# Patient Record
Sex: MALE | Race: WHITE | NOT HISPANIC OR LATINO | Employment: FULL TIME | RURAL
[De-identification: names, ages, dates, MRNs, and addresses within clinical notes are randomized per-mention and may not be internally consistent; named-entity substitution may affect disease eponyms.]

---

## 2022-09-23 ENCOUNTER — OFFICE VISIT (OUTPATIENT)
Dept: FAMILY MEDICINE | Facility: CLINIC | Age: 57
End: 2022-09-23

## 2022-09-23 VITALS
TEMPERATURE: 99 F | WEIGHT: 190 LBS | DIASTOLIC BLOOD PRESSURE: 86 MMHG | HEART RATE: 67 BPM | OXYGEN SATURATION: 98 % | HEIGHT: 71 IN | RESPIRATION RATE: 18 BRPM | BODY MASS INDEX: 26.6 KG/M2 | SYSTOLIC BLOOD PRESSURE: 181 MMHG

## 2022-09-23 DIAGNOSIS — L08.9 SKIN INFECTION: Primary | ICD-10-CM

## 2022-09-23 PROCEDURE — 99202 PR OFFICE/OUTPT VISIT, NEW, LEVL II, 15-29 MIN: ICD-10-PCS | Mod: ,,, | Performed by: FAMILY MEDICINE

## 2022-09-23 PROCEDURE — 99051 PR MEDICAL SERVICES, EVE/WKEND/HOLIDAY: ICD-10-PCS | Mod: ,,, | Performed by: FAMILY MEDICINE

## 2022-09-23 PROCEDURE — 99202 OFFICE O/P NEW SF 15 MIN: CPT | Mod: ,,, | Performed by: FAMILY MEDICINE

## 2022-09-23 PROCEDURE — 99051 MED SERV EVE/WKEND/HOLIDAY: CPT | Mod: ,,, | Performed by: FAMILY MEDICINE

## 2022-09-23 RX ORDER — CLINDAMYCIN HYDROCHLORIDE 300 MG/1
300 CAPSULE ORAL EVERY 6 HOURS
Qty: 28 CAPSULE | Refills: 0 | Status: SHIPPED | OUTPATIENT
Start: 2022-09-23

## 2022-09-23 RX ORDER — HYDROCODONE BITARTRATE AND ACETAMINOPHEN 7.5; 325 MG/1; MG/1
1 TABLET ORAL EVERY 8 HOURS PRN
Qty: 6 TABLET | Refills: 0 | Status: SHIPPED | OUTPATIENT
Start: 2022-09-23
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
## 2022-10-10 ENCOUNTER — HOSPITAL ENCOUNTER (EMERGENCY)
Facility: HOSPITAL | Age: 57
Discharge: HOME | End: 2022-10-10

## 2022-10-10 VITALS
BODY MASS INDEX: 28 KG/M2 | HEIGHT: 71 IN | TEMPERATURE: 98 F | DIASTOLIC BLOOD PRESSURE: 77 MMHG | OXYGEN SATURATION: 97 % | HEART RATE: 50 BPM | SYSTOLIC BLOOD PRESSURE: 175 MMHG | WEIGHT: 200 LBS | RESPIRATION RATE: 20 BRPM

## 2022-10-10 DIAGNOSIS — T30.0 BURN: ICD-10-CM

## 2022-10-10 DIAGNOSIS — L98.499: Primary | ICD-10-CM

## 2022-10-10 PROCEDURE — 99283 EMERGENCY DEPT VISIT LOW MDM: CPT | Mod: ,,,

## 2022-10-10 PROCEDURE — 99281 EMR DPT VST MAYX REQ PHY/QHP: CPT

## 2022-10-10 PROCEDURE — 99283 PR EMERGENCY DEPT VISIT,LEVEL III: ICD-10-PCS | Mod: ,,,
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
## 2022-10-11 NOTE — DISCHARGE INSTRUCTIONS
Follow-up with Dr. Ambriz, you will be contacted with this appointment date and time. Return to Emergency Department for any new or worsening symptoms.

## 2023-03-14 ENCOUNTER — HOSPITAL ENCOUNTER (EMERGENCY)
Facility: HOSPITAL | Age: 58
Discharge: HOME | End: 2023-03-14

## 2023-03-14 VITALS
TEMPERATURE: 99 F | WEIGHT: 180 LBS | HEIGHT: 71 IN | HEART RATE: 55 BPM | RESPIRATION RATE: 17 BRPM | SYSTOLIC BLOOD PRESSURE: 177 MMHG | OXYGEN SATURATION: 99 % | BODY MASS INDEX: 25.2 KG/M2 | DIASTOLIC BLOOD PRESSURE: 86 MMHG

## 2023-03-14 DIAGNOSIS — S99.929A FOOT INJURY: ICD-10-CM

## 2023-03-14 DIAGNOSIS — M79.673 PAIN OF FOOT, UNSPECIFIED LATERALITY: Primary | ICD-10-CM

## 2023-03-14 PROCEDURE — 99284 EMERGENCY DEPT VISIT MOD MDM: CPT

## 2023-03-14 PROCEDURE — 96372 THER/PROPH/DIAG INJ SC/IM: CPT | Performed by: NURSE PRACTITIONER

## 2023-03-14 PROCEDURE — 99283 EMERGENCY DEPT VISIT LOW MDM: CPT | Mod: ,,, | Performed by: NURSE PRACTITIONER

## 2023-03-14 PROCEDURE — 63600175 PHARM REV CODE 636 W HCPCS: Performed by: NURSE PRACTITIONER

## 2023-03-14 PROCEDURE — 99283 PR EMERGENCY DEPT VISIT,LEVEL III: ICD-10-PCS | Mod: ,,, | Performed by: NURSE PRACTITIONER

## 2023-03-14 RX ORDER — KETOROLAC TROMETHAMINE 30 MG/ML
60 INJECTION, SOLUTION INTRAMUSCULAR; INTRAVENOUS
Status: COMPLETED | OUTPATIENT
Start: 2023-03-14 | End: 2023-03-14

## 2023-03-14 RX ORDER — NAPROXEN 375 MG/1
375 TABLET ORAL 2 TIMES DAILY WITH MEALS
Qty: 10 TABLET | Refills: 0 | Status: SHIPPED | OUTPATIENT
Start: 2023-03-14 | End: 2023-03-19

## 2023-03-14 RX ADMIN — KETOROLAC TROMETHAMINE 60 MG: 30 INJECTION, SOLUTION INTRAMUSCULAR at 12:03

## 2023-03-14 NOTE — ED TRIAGE NOTES
Pt reports having right heel/foot pain. He states it started this AM after stepping out of a tractor.

## 2023-03-14 NOTE — ED PROVIDER NOTES
Encounter Date: 3/14/2023       History     Chief Complaint   Patient presents with    Foot Injury     57-year-old male presents to the emergency department to be evaluated for right foot pain.  His foot pain began yesterday after he jumped off of a dump truck and back her repeatedly all day.    The history is provided by the patient.   Foot Injury   The incident occurred yesterday. Pertinent negatives include no numbness, no inability to bear weight, no loss of motion, no muscle weakness, no loss of sensation and no tingling.   Review of patient's allergies indicates:  No Known Allergies  No past medical history on file.  No past surgical history on file.  No family history on file.  Social History     Tobacco Use    Smoking status: Every Day     Packs/day: 1.00     Types: Cigarettes    Smokeless tobacco: Never     Review of Systems   Respiratory:  Negative for shortness of breath.    Cardiovascular:  Negative for chest pain.   Musculoskeletal:  Negative for back pain and neck pain.   Neurological:  Negative for tingling and numbness.   All other systems reviewed and are negative.    Physical Exam     Initial Vitals [03/14/23 1148]   BP Pulse Resp Temp SpO2   (!) 177/86 (!) 55 17 98.7 °F (37.1 °C) 99 %      MAP       --         Physical Exam    Vitals reviewed.  Constitutional: He appears well-developed and well-nourished.   Neck: Neck supple.   Cardiovascular:  Normal rate and regular rhythm.           Pulmonary/Chest: Breath sounds normal.   Abdominal: Abdomen is soft. Bowel sounds are normal. He exhibits no distension and no mass. There is no abdominal tenderness. There is no rebound and no guarding.   Musculoskeletal:         General: Normal range of motion.      Cervical back: Neck supple.      Right foot: Normal.     Neurological: He is alert and oriented to person, place, and time. He has normal strength. GCS score is 15. GCS eye subscore is 4. GCS verbal subscore is 5. GCS motor subscore is 6.   Skin: Skin  is warm and dry. Capillary refill takes less than 2 seconds.   Psychiatric: He has a normal mood and affect.       Medical Screening Exam   See Full Note    ED Course   Procedures  Labs Reviewed - No data to display       Imaging Results              X-Ray Foot Complete Right (Final result)  Result time 23 12:19:51      Final result by Fabian Muñoz MD (23 12:19:51)                   Impression:      As above.      Electronically signed by: Fabian Muñoz  Date:    2023  Time:    12:19               Narrative:    EXAMINATION:  XR FOOT COMPLETE 3 VIEW RIGHT    CLINICAL HISTORY:  . Unspecified injury of unspecified foot, initial encounter    TECHNIQUE:  AP, lateral, and oblique views of the right foot were performed.    COMPARISON:  None    FINDINGS:  No fracture identified.  No dislocation.  There is a hallux valgus deformity with osteophyte formation of the great toe MTP.  Calcaneal enthesophytes seen.                                       Medications   ketorolac injection 60 mg (has no administration in time range)     Medical Decision Makin-year-old male presents to the emergency department to be evaluated for right foot pain.  His foot pain began yesterday after he jumped off of a dump truck and back her repeatedly all day.  I ordered X-rays and personally reviewed them and reviewed the radiologist interpretation.  Xray significant for No fracture identified.  No dislocation.  There is a hallux valgus deformity with osteophyte formation of the great toe MTP.  Calcaneal enthesophytes seen.    Patient received Toradol IM in the emergency department  Patient was discharged with a prescription for naproxen  Diagnosis:  Foot pain  Patient was discharged in stable condition.  Detailed return precautions discussed.                   Clinical Impression:   Final diagnoses:  [S99.929A] Foot injury  [M79.673] Pain of foot, unspecified laterality (Primary)        ED Disposition Condition    Discharge  Stable          ED Prescriptions       Medication Sig Dispense Start Date End Date Auth. Provider    naproxen (NAPROSYN) 375 MG tablet Take 1 tablet (375 mg total) by mouth 2 (two) times daily with meals. for 5 days 10 tablet 3/14/2023 3/19/2023 JOSH Ward          Follow-up Information    None          JOSH Ward  03/14/23 4723

## 2024-05-11 ENCOUNTER — HOSPITAL ENCOUNTER (EMERGENCY)
Facility: HOSPITAL | Age: 59
Discharge: HOME | End: 2024-05-11
Attending: EMERGENCY MEDICINE

## 2024-05-11 VITALS
HEIGHT: 71 IN | RESPIRATION RATE: 20 BRPM | WEIGHT: 180 LBS | SYSTOLIC BLOOD PRESSURE: 144 MMHG | OXYGEN SATURATION: 96 % | DIASTOLIC BLOOD PRESSURE: 54 MMHG | TEMPERATURE: 98 F | BODY MASS INDEX: 25.2 KG/M2 | HEART RATE: 59 BPM

## 2024-05-11 DIAGNOSIS — D72.829 LEUKOCYTOSIS, UNSPECIFIED TYPE: ICD-10-CM

## 2024-05-11 DIAGNOSIS — N20.1 LEFT URETERAL STONE: Primary | ICD-10-CM

## 2024-05-11 DIAGNOSIS — N13.30 HYDROURETERONEPHROSIS: ICD-10-CM

## 2024-05-11 LAB
ALBUMIN SERPL BCP-MCNC: 3.5 G/DL (ref 3.5–5)
ALBUMIN/GLOB SERPL: 0.9 {RATIO}
ALP SERPL-CCNC: 76 U/L (ref 45–115)
ALT SERPL W P-5'-P-CCNC: 17 U/L (ref 16–61)
AMYLASE SERPL-CCNC: 25 U/L (ref 25–115)
ANION GAP SERPL CALCULATED.3IONS-SCNC: 8 MMOL/L (ref 7–16)
AST SERPL W P-5'-P-CCNC: 24 U/L (ref 15–37)
BACTERIA #/AREA URNS HPF: ABNORMAL /HPF
BASOPHILS # BLD AUTO: 0.04 K/UL (ref 0–0.2)
BASOPHILS NFR BLD AUTO: 0.3 % (ref 0–1)
BILIRUB SERPL-MCNC: 0.8 MG/DL (ref ?–1.2)
BILIRUB UR QL STRIP: NEGATIVE
BUN SERPL-MCNC: 12 MG/DL (ref 7–18)
BUN/CREAT SERPL: 13 (ref 6–20)
CALCIUM SERPL-MCNC: 9.3 MG/DL (ref 8.5–10.1)
CHLORIDE SERPL-SCNC: 108 MMOL/L (ref 98–107)
CLARITY UR: ABNORMAL
CO2 SERPL-SCNC: 27 MMOL/L (ref 21–32)
COLOR UR: YELLOW
CREAT SERPL-MCNC: 0.94 MG/DL (ref 0.7–1.3)
DIFFERENTIAL METHOD BLD: ABNORMAL
EGFR (NO RACE VARIABLE) (RUSH/TITUS): 94 ML/MIN/1.73M2
EOSINOPHIL # BLD AUTO: 0 K/UL (ref 0–0.5)
EOSINOPHIL NFR BLD AUTO: 0 % (ref 1–4)
ERYTHROCYTE [DISTWIDTH] IN BLOOD BY AUTOMATED COUNT: 13.4 % (ref 11.5–14.5)
GLOBULIN SER-MCNC: 3.7 G/DL (ref 2–4)
GLUCOSE SERPL-MCNC: 142 MG/DL (ref 74–106)
GLUCOSE UR STRIP-MCNC: NORMAL MG/DL
HCT VFR BLD AUTO: 49.6 % (ref 40–54)
HGB BLD-MCNC: 15.9 G/DL (ref 13.5–18)
IMM GRANULOCYTES # BLD AUTO: 0.06 K/UL (ref 0–0.04)
IMM GRANULOCYTES NFR BLD: 0.4 % (ref 0–0.4)
KETONES UR STRIP-SCNC: NEGATIVE MG/DL
LEUKOCYTE ESTERASE UR QL STRIP: ABNORMAL
LIPASE SERPL-CCNC: 25 U/L (ref 16–77)
LYMPHOCYTES # BLD AUTO: 0.92 K/UL (ref 1–4.8)
LYMPHOCYTES NFR BLD AUTO: 5.8 % (ref 27–41)
MCH RBC QN AUTO: 28.9 PG (ref 27–31)
MCHC RBC AUTO-ENTMCNC: 32.1 G/DL (ref 32–36)
MCV RBC AUTO: 90 FL (ref 80–96)
MONOCYTES # BLD AUTO: 1.36 K/UL (ref 0–0.8)
MONOCYTES NFR BLD AUTO: 8.6 % (ref 2–6)
MPC BLD CALC-MCNC: 10.4 FL (ref 9.4–12.4)
MUCOUS, UA: ABNORMAL /LPF
NEUTROPHILS # BLD AUTO: 13.4 K/UL (ref 1.8–7.7)
NEUTROPHILS NFR BLD AUTO: 84.9 % (ref 53–65)
NITRITE UR QL STRIP: POSITIVE
NRBC # BLD AUTO: 0 X10E3/UL
NRBC, AUTO (.00): 0 %
PH UR STRIP: 8.5 PH UNITS
PLATELET # BLD AUTO: 200 K/UL (ref 150–400)
POTASSIUM SERPL-SCNC: 4 MMOL/L (ref 3.5–5.1)
PROT SERPL-MCNC: 7.2 G/DL (ref 6.4–8.2)
PROT UR QL STRIP: 200
RBC # BLD AUTO: 5.51 M/UL (ref 4.6–6.2)
RBC # UR STRIP: ABNORMAL /UL
RBC #/AREA URNS HPF: 70 /HPF
SODIUM SERPL-SCNC: 139 MMOL/L (ref 136–145)
SP GR UR STRIP: 1.04
SQUAMOUS #/AREA URNS LPF: ABNORMAL /HPF
TRI-PHOS CRY UR QL COMP ASSIST: ABNORMAL
UROBILINOGEN UR STRIP-ACNC: NORMAL MG/DL
WBC # BLD AUTO: 15.78 K/UL (ref 4.5–11)
WBC #/AREA URNS HPF: >182 /HPF

## 2024-05-11 PROCEDURE — 25000003 PHARM REV CODE 250: Performed by: NURSE PRACTITIONER

## 2024-05-11 PROCEDURE — 80053 COMPREHEN METABOLIC PANEL: CPT | Performed by: NURSE PRACTITIONER

## 2024-05-11 PROCEDURE — 99284 EMERGENCY DEPT VISIT MOD MDM: CPT | Mod: ,,, | Performed by: NURSE PRACTITIONER

## 2024-05-11 PROCEDURE — 96361 HYDRATE IV INFUSION ADD-ON: CPT

## 2024-05-11 PROCEDURE — 96375 TX/PRO/DX INJ NEW DRUG ADDON: CPT

## 2024-05-11 PROCEDURE — 83690 ASSAY OF LIPASE: CPT | Performed by: NURSE PRACTITIONER

## 2024-05-11 PROCEDURE — 25500020 PHARM REV CODE 255: Performed by: NURSE PRACTITIONER

## 2024-05-11 PROCEDURE — 87086 URINE CULTURE/COLONY COUNT: CPT | Performed by: NURSE PRACTITIONER

## 2024-05-11 PROCEDURE — 82150 ASSAY OF AMYLASE: CPT | Performed by: NURSE PRACTITIONER

## 2024-05-11 PROCEDURE — 63600175 PHARM REV CODE 636 W HCPCS: Performed by: NURSE PRACTITIONER

## 2024-05-11 PROCEDURE — 96376 TX/PRO/DX INJ SAME DRUG ADON: CPT

## 2024-05-11 PROCEDURE — 81003 URINALYSIS AUTO W/O SCOPE: CPT | Performed by: NURSE PRACTITIONER

## 2024-05-11 PROCEDURE — 99285 EMERGENCY DEPT VISIT HI MDM: CPT | Mod: 25

## 2024-05-11 PROCEDURE — 63600175 PHARM REV CODE 636 W HCPCS: Performed by: EMERGENCY MEDICINE

## 2024-05-11 PROCEDURE — 96365 THER/PROPH/DIAG IV INF INIT: CPT

## 2024-05-11 PROCEDURE — 85025 COMPLETE CBC W/AUTO DIFF WBC: CPT | Performed by: NURSE PRACTITIONER

## 2024-05-11 RX ORDER — MORPHINE SULFATE 4 MG/ML
4 INJECTION, SOLUTION INTRAMUSCULAR; INTRAVENOUS
Status: COMPLETED | OUTPATIENT
Start: 2024-05-11 | End: 2024-05-11

## 2024-05-11 RX ORDER — ONDANSETRON HYDROCHLORIDE 2 MG/ML
4 INJECTION, SOLUTION INTRAVENOUS
Status: COMPLETED | OUTPATIENT
Start: 2024-05-11 | End: 2024-05-11

## 2024-05-11 RX ORDER — SULFAMETHOXAZOLE AND TRIMETHOPRIM 800; 160 MG/1; MG/1
1 TABLET ORAL 2 TIMES DAILY
Qty: 20 TABLET | Refills: 0 | Status: SHIPPED | OUTPATIENT
Start: 2024-05-11 | End: 2024-05-21

## 2024-05-11 RX ORDER — MORPHINE SULFATE 4 MG/ML
4 INJECTION, SOLUTION INTRAMUSCULAR; INTRAVENOUS
Status: DISCONTINUED | OUTPATIENT
Start: 2024-05-11 | End: 2024-05-11 | Stop reason: HOSPADM

## 2024-05-11 RX ORDER — PROMETHAZINE HYDROCHLORIDE 25 MG/1
25 TABLET ORAL EVERY 6 HOURS PRN
Qty: 15 TABLET | Refills: 0 | Status: SHIPPED | OUTPATIENT
Start: 2024-05-11

## 2024-05-11 RX ORDER — HYDROCODONE BITARTRATE AND ACETAMINOPHEN 5; 325 MG/1; MG/1
1 TABLET ORAL EVERY 6 HOURS PRN
Qty: 12 TABLET | Refills: 0 | Status: SHIPPED | OUTPATIENT
Start: 2024-05-11

## 2024-05-11 RX ORDER — KETOROLAC TROMETHAMINE 30 MG/ML
15 INJECTION, SOLUTION INTRAMUSCULAR; INTRAVENOUS
Status: COMPLETED | OUTPATIENT
Start: 2024-05-11 | End: 2024-05-11

## 2024-05-11 RX ORDER — TAMSULOSIN HYDROCHLORIDE 0.4 MG/1
0.4 CAPSULE ORAL DAILY
Qty: 10 CAPSULE | Refills: 0 | Status: SHIPPED | OUTPATIENT
Start: 2024-05-11 | End: 2025-05-11

## 2024-05-11 RX ADMIN — MORPHINE SULFATE 4 MG: 4 INJECTION INTRAVENOUS at 12:05

## 2024-05-11 RX ADMIN — CEFTRIAXONE SODIUM 1 G: 1 INJECTION, POWDER, FOR SOLUTION INTRAMUSCULAR; INTRAVENOUS at 05:05

## 2024-05-11 RX ADMIN — SODIUM CHLORIDE 1000 ML: 9 INJECTION, SOLUTION INTRAVENOUS at 12:05

## 2024-05-11 RX ADMIN — MORPHINE SULFATE 4 MG: 4 INJECTION INTRAVENOUS at 02:05

## 2024-05-11 RX ADMIN — IOPAMIDOL 100 ML: 755 INJECTION, SOLUTION INTRAVENOUS at 01:05

## 2024-05-11 RX ADMIN — KETOROLAC TROMETHAMINE 15 MG: 30 INJECTION, SOLUTION INTRAMUSCULAR at 05:05

## 2024-05-11 RX ADMIN — ONDANSETRON 4 MG: 2 INJECTION INTRAMUSCULAR; INTRAVENOUS at 12:05

## 2024-05-11 NOTE — ED NOTES
Voiced concern to Dr. Srivastava regarding this patient getting discharged in his current state. Patient is homeless and living in his truck with next to no resources or support. Patient states that he may be able to get his prescriptions if they are cheap enough, but does voice concern about actually being able to get to an outpatient appointment for his very large and obstructing kidney stone. Patient verbalizes to still be in significant pain and visually appears to be hurting. Verbalized concern as this patient can be high risk to become septic or more ill without the treatment that he needs. Patient still advised to be discharged and follow up with urology outpatient by MD. Patient given toradol IV instead of narcotics so that he can safely drive as he has nobody to pick him up. Patient advised to go straight to pharmacy and  medications and take them as prescribed. Patient also informed to return to ER as needed for severe pain, fever, etc. Patient wheeled out of ED and brought to his truck. Patient verbalized understanding at this time.

## 2024-05-11 NOTE — ED PROVIDER NOTES
Encounter Date: 5/11/2024       History     Chief Complaint   Patient presents with    Abdominal Pain     Pain radiates into back     Vomiting     HPI  Pt is a 57 y/o WM who presents to ER with c/o LLQ pain radiating through to his back. States unable to tolerate PO, even water. He states he is homeless and lives in his vehicle. He drinks a lot of soda but not a lot of water. States he has loose stool. Denies hematochezia or melena. Denies prior c-scope.     Review of patient's allergies indicates:  No Known Allergies  History reviewed. No pertinent past medical history.  Past Surgical History:   Procedure Laterality Date    NECK SURGERY       No family history on file.  Social History     Tobacco Use    Smoking status: Every Day     Current packs/day: 1.00     Types: Cigarettes    Smokeless tobacco: Never   Substance Use Topics    Alcohol use: Never     Review of Systems   Constitutional:  Negative for chills, diaphoresis and fever.   Respiratory:  Negative for shortness of breath.    Cardiovascular:  Negative for chest pain.   Gastrointestinal:  Positive for abdominal pain, diarrhea, nausea and vomiting. Negative for abdominal distention, anal bleeding, blood in stool, constipation and rectal pain.       Physical Exam     Initial Vitals [05/11/24 1210]   BP Pulse Resp Temp SpO2   (!) 183/81 63 18 98.4 °F (36.9 °C) 97 %      MAP       --         Physical Exam    Constitutional: He appears well-developed and well-nourished.   HENT:   Head: Normocephalic.   Eyes: Pupils are equal, round, and reactive to light.   Cardiovascular:  Normal rate.           Pulmonary/Chest: Breath sounds normal. No respiratory distress.   Abdominal: Abdomen is soft. He exhibits no distension and no mass. There is no abdominal tenderness. There is no rebound and no guarding.   Musculoskeletal:         General: No edema.     Neurological: He is alert and oriented to person, place, and time. GCS score is 15. GCS eye subscore is 4. GCS verbal  subscore is 5. GCS motor subscore is 6.   Skin: Skin is warm and dry. No erythema. No pallor.   Psychiatric: He has a normal mood and affect. His behavior is normal. Judgment and thought content normal.         Medical Screening Exam   See Full Note    ED Course   Procedures  Labs Reviewed   CULTURE, URINE - Abnormal; Notable for the following components:       Result Value    Culture, Urine >100,000 Proteus vulgaris (*)     Culture, Urine >100,000 Enterococcus faecalis (*)     All other components within normal limits   COMPREHENSIVE METABOLIC PANEL - Abnormal; Notable for the following components:    Chloride 108 (*)     Glucose 142 (*)     All other components within normal limits   URINALYSIS, REFLEX TO URINE CULTURE - Abnormal; Notable for the following components:    pH, UA 8.5 (*)     Leukocytes, UA Large (*)     Nitrites, UA Positive (*)     Protein,  (*)     Blood, UA Small (*)     Specific Gravity, UA 1.044 (*)     All other components within normal limits   CBC WITH DIFFERENTIAL - Abnormal; Notable for the following components:    WBC 15.78 (*)     Neutrophils % 84.9 (*)     Lymphocytes % 5.8 (*)     Monocytes % 8.6 (*)     Eosinophils % 0.0 (*)     Neutrophils, Abs 13.40 (*)     Lymphocytes, Absolute 0.92 (*)     Monocytes, Absolute 1.36 (*)     Immature Granulocytes, Absolute 0.06 (*)     All other components within normal limits   URINALYSIS, MICROSCOPIC - Abnormal; Notable for the following components:    WBC, UA >182 (*)     RBC, UA 70 (*)     Bacteria, UA Moderate (*)     Squamous Epithelial Cells, UA Occasional (*)     Mucous Occasional (*)     Triple Phosphate Crystals, UA Few (*)     All other components within normal limits   AMYLASE - Normal   LIPASE - Normal   CBC W/ AUTO DIFFERENTIAL    Narrative:     The following orders were created for panel order CBC auto differential.  Procedure                               Abnormality         Status                     ---------                                -----------         ------                     CBC with Differential[[id number]]       Abnormal            Final result                 Please view results for these tests on the individual orders.          Imaging Results              CT Abdomen Pelvis With IV Contrast NO Oral Contrast (Final result)  Result time 05/11/24 14:06:46      Final result by Rios Velásquez DO (05/11/24 14:06:46)                   Impression:      Obstructing 9 mm stone within the proximal left-sided ureter resulting in mild left hydroureteronephrosis. Additional 1.4 cm stone located within the left renal collecting system.    Prostatomegaly, correlate with PSA.    1.2 cm hyperdensity located within the gallbladder, sludge versus gallstone versus polyp. Right upper quadrant ultrasound recommended on an outpatient basis.      Electronically signed by: Rios Velásquez  Date:    05/11/2024  Time:    14:06               Narrative:    EXAMINATION:  CT ABDOMEN PELVIS WITH IV CONTRAST    CLINICAL HISTORY:  LLQ abdominal pain;    COMPARISON:  None.    TECHNIQUE:  CT ABDOMEN PELVIS WITH IV [id number] cc of Isovue 370    Dose reduction:    The CT exam was performed using one or more dose reduction techniques: Automatic exposure control, automated adjustment of the MA and/or kVP according to patient size, or use of iterative reconstruction technique.    FINDINGS:  Lower lobes: Clear.    Cardiac: No effusion.    Abdomen:    Hepatobiliary/gallbladder: No focal liver lesion.  1.2 cm hyperdensity located within the gallbladder, sludge versus gallstone versus polyp.    Spleen: Normal    Pancreas: Normal    Adrenal/Genitourinary system: Obstructing 9 mm stone within the proximal left-sided ureter resulting in mild left hydroureteronephrosis.  Additional 1.4 cm stone located within the left renal collecting system.    Bowel and Mesentery: There is no evidence for bowel obstruction.  Normal appendix.    Peritoneum:  Normal.    Retroperitoneum: No enlarged lymph nodes.    Vasculature: Calcified vascular disease    Reproductive: Moderately enlarged prostate gland    Lymph nodes: No enlarged lymph nodes.    Abdominal wall: Normal.    Osseous structures: Multilevel degenerative change of the lumbar spine                                       Medications   morphine injection 4 mg (4 mg Intravenous Given 5/11/24 1236)   ondansetron injection 4 mg (4 mg Intravenous Given 5/11/24 1236)   sodium chloride 0.9% bolus 1,000 mL 1,000 mL (0 mLs Intravenous Stopped 5/11/24 1337)   iopamidoL (ISOVUE-370) injection 100 mL (100 mLs Intravenous Given 5/11/24 1336)   morphine injection 4 mg (4 mg Intravenous Given 5/11/24 1433)   ketorolac injection 15 mg (15 mg Intravenous Given 5/11/24 1739)     Medical Decision Making  Amount and/or Complexity of Data Reviewed  Labs: ordered.  Radiology: ordered.    Risk  Prescription drug management.               ED Course as of 05/15/24 1358   Sat May 11, 2024   1710 Seen also by physician.  Patient presents with left-sided flank pain over the past few weeks.  No associated fever or chills.  White count elevated 15.7 does have bacteriuria and pyuria.  Treated with Rocephin in the ED. CT only shows mild hydronephrosis obstructing stone.  This was discussed with urologist Dr. Mcnally at [facility].  Patient should do well at home did not recommend transfer for intervention at this point.  Patient does want to go home also.  He says he will return the ER if he develops fever chills or worsened anyway.  Will have ambulatory referral to Urology [PK]      ED Course User Index  [PK] Connor Srivastava MD        MDM: 59 y/o homeless WM with LLQ pain, N/V. Labs, CT, urine, fluids, pain and nausea meds pending.         1423- reassessed, morphine helped pain, resting comfortably now, zofran alleviated nausea, 9 mm obstructing ureteral stone with hydroureteronephrosis, incidental GB polyp vs stone on CT    1500- have d/w  Dr. Srivastava, UA pending     1530- UA showing UTI, with leukocytosis and obstructing stone, concern to become septic, will transfer to facility with urology coverage (none here today); transfer order placed; pt updated and also instructed pt to f/u outpatient for RUQ u/s for GB stone vs polyp    1700- Dr. Srivastava d/w [facility], they state if pt is afebrile and pain controlled can d/c, Dr. Srivastava assessed at bedside    Will d/c with bactrim, phenergan, norco, flomax    Clinical Impression:   Final diagnoses:  [N20.1] Left ureteral stone (Primary)  [N13.30] Hydroureteronephrosis  [D72.829] Leukocytosis, unspecified type       ED Disposition Condition    Discharge Stable          ED Prescriptions       Medication Sig Dispense Start Date End Date Auth. Provider    sulfamethoxazole-trimethoprim 800-160mg (BACTRIM DS) 800-160 mg Tab Take 1 tablet by mouth 2 (two) times daily. for 10 days 20 tablet 5/11/2024 5/21/2024 Henna Ferrer FNP    promethazine (PHENERGAN) 25 MG tablet Take 1 tablet (25 mg total) by mouth every 6 (six) hours as needed for Nausea. 15 tablet 5/11/2024 -- Henna Ferrer FNP    tamsulosin (FLOMAX) 0.4 mg Cap Take 1 capsule (0.4 mg total) by mouth once daily. 10 capsule 5/11/2024 5/11/2025 Henna Ferrer FNP    HYDROcodone-acetaminophen (NORCO) 5-325 mg per tablet Take 1 tablet by mouth every 6 (six) hours as needed for Pain. 12 tablet 5/11/2024 -- Henna Ferrer FNP          Follow-up Information       Follow up With Specialties Details Why Contact Info    [facility] - Emergency Department Emergency Medicine  As needed [address] [place] [id number] [phone]    Chi Canales, NP Urology   [address] [place] [address] Neurology  [facility] [id number]  [phone]               Henna Ferrer FNP  05/11/24 1224       Henna Ferrer FNP  05/11/24 1540       Henna Ferrer, [facility]  05/11/24 1623       Carissa,  Henna KRUSE, [facility]  05/11/24 1700       Henna Ferrer, [facility]  05/11/24 1705

## 2024-05-11 NOTE — DISCHARGE INSTRUCTIONS
Follow up with urology. We will call you with appointment. Call [phone] if you have not heard in 1 week.    Take antibiotics as directed.  Drink plenty of water.    Return to ER for new or worsening symptoms.

## 2024-05-13 ENCOUNTER — TELEPHONE (OUTPATIENT)
Dept: EMERGENCY MEDICINE | Facility: HOSPITAL | Age: 59
End: 2024-05-13

## 2024-05-13 LAB
UA COMPLETE W REFLEX CULTURE PNL UR: ABNORMAL
UA COMPLETE W REFLEX CULTURE PNL UR: ABNORMAL

## 2024-05-13 RX ORDER — CIPROFLOXACIN 500 MG/1
500 TABLET ORAL 2 TIMES DAILY
Qty: 20 TABLET | Refills: 0 | Status: SHIPPED | OUTPATIENT
Start: 2024-05-13 | End: 2024-05-23

## 2024-05-13 NOTE — TELEPHONE ENCOUNTER
----- Message from Itzel Oliveros, JOSH sent at 5/13/2024  8:56 AM CDT -----  Please let the patient know that I sent in a RX for antibiotics

## 2024-05-20 ENCOUNTER — HOSPITAL ENCOUNTER (EMERGENCY)
Facility: HOSPITAL | Age: 59
Discharge: ANOTHER HOSPITAL | End: 2024-05-21
Attending: EMERGENCY MEDICINE

## 2024-05-20 DIAGNOSIS — N39.0 URINARY TRACT INFECTION WITHOUT HEMATURIA, SITE UNSPECIFIED: Primary | ICD-10-CM

## 2024-05-20 DIAGNOSIS — N20.1 URETEROLITHIASIS: ICD-10-CM

## 2024-05-20 LAB
ALBUMIN SERPL BCP-MCNC: 3.5 G/DL (ref 3.5–5)
ALBUMIN/GLOB SERPL: 0.9 {RATIO}
ALP SERPL-CCNC: 86 U/L (ref 45–115)
ALT SERPL W P-5'-P-CCNC: 35 U/L (ref 16–61)
ANION GAP SERPL CALCULATED.3IONS-SCNC: 6 MMOL/L (ref 7–16)
AST SERPL W P-5'-P-CCNC: 28 U/L (ref 15–37)
BACTERIA #/AREA URNS HPF: ABNORMAL /HPF
BASOPHILS # BLD AUTO: 0.06 K/UL (ref 0–0.2)
BASOPHILS NFR BLD AUTO: 0.5 % (ref 0–1)
BILIRUB SERPL-MCNC: 0.4 MG/DL (ref ?–1.2)
BILIRUB UR QL STRIP: NEGATIVE
BUN SERPL-MCNC: 13 MG/DL (ref 7–18)
BUN/CREAT SERPL: 10 (ref 6–20)
CALCIUM SERPL-MCNC: 8.8 MG/DL (ref 8.5–10.1)
CHLORIDE SERPL-SCNC: 107 MMOL/L (ref 98–107)
CLARITY UR: ABNORMAL
CO2 SERPL-SCNC: 27 MMOL/L (ref 21–32)
COLOR UR: YELLOW
CREAT SERPL-MCNC: 1.24 MG/DL (ref 0.7–1.3)
DIFFERENTIAL METHOD BLD: ABNORMAL
EGFR (NO RACE VARIABLE) (RUSH/TITUS): 67 ML/MIN/1.73M2
EOSINOPHIL # BLD AUTO: 0.1 K/UL (ref 0–0.5)
EOSINOPHIL NFR BLD AUTO: 0.8 % (ref 1–4)
ERYTHROCYTE [DISTWIDTH] IN BLOOD BY AUTOMATED COUNT: 13.7 % (ref 11.5–14.5)
GLOBULIN SER-MCNC: 3.9 G/DL (ref 2–4)
GLUCOSE SERPL-MCNC: 98 MG/DL (ref 74–106)
GLUCOSE UR STRIP-MCNC: NORMAL MG/DL
HCT VFR BLD AUTO: 49.2 % (ref 40–54)
HGB BLD-MCNC: 15.6 G/DL (ref 13.5–18)
IMM GRANULOCYTES # BLD AUTO: 0.11 K/UL (ref 0–0.04)
IMM GRANULOCYTES NFR BLD: 0.9 % (ref 0–0.4)
KETONES UR STRIP-SCNC: NEGATIVE MG/DL
LEUKOCYTE ESTERASE UR QL STRIP: ABNORMAL
LYMPHOCYTES # BLD AUTO: 1.31 K/UL (ref 1–4.8)
LYMPHOCYTES NFR BLD AUTO: 10.6 % (ref 27–41)
MCH RBC QN AUTO: 28.8 PG (ref 27–31)
MCHC RBC AUTO-ENTMCNC: 31.7 G/DL (ref 32–36)
MCV RBC AUTO: 90.9 FL (ref 80–96)
MONOCYTES # BLD AUTO: 1.12 K/UL (ref 0–0.8)
MONOCYTES NFR BLD AUTO: 9.1 % (ref 2–6)
MPC BLD CALC-MCNC: 9.5 FL (ref 9.4–12.4)
MUCOUS, UA: ABNORMAL /LPF
NEUTROPHILS # BLD AUTO: 9.61 K/UL (ref 1.8–7.7)
NEUTROPHILS NFR BLD AUTO: 78.1 % (ref 53–65)
NITRITE UR QL STRIP: POSITIVE
NRBC # BLD AUTO: 0 X10E3/UL
NRBC, AUTO (.00): 0 %
PH UR STRIP: 6 PH UNITS
PLATELET # BLD AUTO: 243 K/UL (ref 150–400)
POTASSIUM SERPL-SCNC: 4.2 MMOL/L (ref 3.5–5.1)
PROT SERPL-MCNC: 7.4 G/DL (ref 6.4–8.2)
PROT UR QL STRIP: 20
RBC # BLD AUTO: 5.41 M/UL (ref 4.6–6.2)
RBC # UR STRIP: ABNORMAL /UL
RBC #/AREA URNS HPF: 7 /HPF
SODIUM SERPL-SCNC: 136 MMOL/L (ref 136–145)
SP GR UR STRIP: 1.02
SQUAMOUS #/AREA URNS LPF: ABNORMAL /HPF
UROBILINOGEN UR STRIP-ACNC: NORMAL MG/DL
WBC # BLD AUTO: 12.31 K/UL (ref 4.5–11)
WBC #/AREA URNS HPF: >182 /HPF

## 2024-05-20 PROCEDURE — 36415 COLL VENOUS BLD VENIPUNCTURE: CPT | Performed by: EMERGENCY MEDICINE

## 2024-05-20 PROCEDURE — 96375 TX/PRO/DX INJ NEW DRUG ADDON: CPT

## 2024-05-20 PROCEDURE — 80053 COMPREHEN METABOLIC PANEL: CPT | Performed by: EMERGENCY MEDICINE

## 2024-05-20 PROCEDURE — 87086 URINE CULTURE/COLONY COUNT: CPT | Performed by: EMERGENCY MEDICINE

## 2024-05-20 PROCEDURE — 63600175 PHARM REV CODE 636 W HCPCS: Performed by: EMERGENCY MEDICINE

## 2024-05-20 PROCEDURE — 85025 COMPLETE CBC W/AUTO DIFF WBC: CPT | Performed by: EMERGENCY MEDICINE

## 2024-05-20 PROCEDURE — 99285 EMERGENCY DEPT VISIT HI MDM: CPT | Mod: 25

## 2024-05-20 PROCEDURE — 81003 URINALYSIS AUTO W/O SCOPE: CPT | Performed by: EMERGENCY MEDICINE

## 2024-05-20 PROCEDURE — 25000003 PHARM REV CODE 250: Performed by: EMERGENCY MEDICINE

## 2024-05-20 PROCEDURE — 87040 BLOOD CULTURE FOR BACTERIA: CPT | Performed by: EMERGENCY MEDICINE

## 2024-05-20 PROCEDURE — 96365 THER/PROPH/DIAG IV INF INIT: CPT

## 2024-05-20 RX ORDER — KETOROLAC TROMETHAMINE 30 MG/ML
30 INJECTION, SOLUTION INTRAMUSCULAR; INTRAVENOUS
Status: COMPLETED | OUTPATIENT
Start: 2024-05-20 | End: 2024-05-20

## 2024-05-20 RX ADMIN — CEFTRIAXONE SODIUM 1 G: 1 INJECTION, POWDER, FOR SOLUTION INTRAMUSCULAR; INTRAVENOUS at 11:05

## 2024-05-20 RX ADMIN — KETOROLAC TROMETHAMINE 30 MG: 30 INJECTION, SOLUTION INTRAMUSCULAR at 11:05

## 2024-05-21 VITALS
SYSTOLIC BLOOD PRESSURE: 126 MMHG | HEIGHT: 71 IN | DIASTOLIC BLOOD PRESSURE: 51 MMHG | BODY MASS INDEX: 25.34 KG/M2 | OXYGEN SATURATION: 93 % | HEART RATE: 49 BPM | WEIGHT: 181 LBS | TEMPERATURE: 98 F | RESPIRATION RATE: 13 BRPM

## 2024-05-21 PROBLEM — N39.0 URINARY TRACT INFECTION WITHOUT HEMATURIA: Status: ACTIVE | Noted: 2024-05-21

## 2024-05-21 PROBLEM — N20.1 URETEROLITHIASIS: Status: ACTIVE | Noted: 2024-05-21

## 2024-05-21 PROCEDURE — 96361 HYDRATE IV INFUSION ADD-ON: CPT

## 2024-05-21 PROCEDURE — 63600175 PHARM REV CODE 636 W HCPCS: Performed by: EMERGENCY MEDICINE

## 2024-05-21 PROCEDURE — 96375 TX/PRO/DX INJ NEW DRUG ADDON: CPT

## 2024-05-21 PROCEDURE — 96376 TX/PRO/DX INJ SAME DRUG ADON: CPT

## 2024-05-21 RX ORDER — FENTANYL CITRATE 50 UG/ML
50 INJECTION, SOLUTION INTRAMUSCULAR; INTRAVENOUS
Status: COMPLETED | OUTPATIENT
Start: 2024-05-21 | End: 2024-05-21

## 2024-05-21 RX ORDER — ONDANSETRON HYDROCHLORIDE 2 MG/ML
4 INJECTION, SOLUTION INTRAVENOUS
Status: COMPLETED | OUTPATIENT
Start: 2024-05-21 | End: 2024-05-21

## 2024-05-21 RX ORDER — MORPHINE SULFATE 4 MG/ML
4 INJECTION, SOLUTION INTRAMUSCULAR; INTRAVENOUS
Status: COMPLETED | OUTPATIENT
Start: 2024-05-21 | End: 2024-05-21

## 2024-05-21 RX ORDER — HYDROMORPHONE HYDROCHLORIDE 2 MG/ML
1 INJECTION, SOLUTION INTRAMUSCULAR; INTRAVENOUS; SUBCUTANEOUS
Status: COMPLETED | OUTPATIENT
Start: 2024-05-21 | End: 2024-05-21

## 2024-05-21 RX ADMIN — FENTANYL CITRATE 50 MCG: 100 INJECTION, SOLUTION INTRAMUSCULAR; INTRAVENOUS at 03:05

## 2024-05-21 RX ADMIN — ONDANSETRON 4 MG: 2 INJECTION INTRAMUSCULAR; INTRAVENOUS at 12:05

## 2024-05-21 RX ADMIN — SODIUM CHLORIDE, POTASSIUM CHLORIDE, SODIUM LACTATE AND CALCIUM CHLORIDE 1000 ML: 600; 310; 30; 20 INJECTION, SOLUTION INTRAVENOUS at 02:05

## 2024-05-21 RX ADMIN — MORPHINE SULFATE 4 MG: 4 INJECTION INTRAVENOUS at 12:05

## 2024-05-21 RX ADMIN — ONDANSETRON 4 MG: 2 INJECTION INTRAMUSCULAR; INTRAVENOUS at 07:05

## 2024-05-21 RX ADMIN — HYDROMORPHONE HYDROCHLORIDE 1 MG: 2 INJECTION, SOLUTION INTRAMUSCULAR; INTRAVENOUS; SUBCUTANEOUS at 09:05

## 2024-05-21 RX ADMIN — ONDANSETRON 4 MG: 2 INJECTION INTRAMUSCULAR; INTRAVENOUS at 05:05

## 2024-05-21 RX ADMIN — HYDROMORPHONE HYDROCHLORIDE 1 MG: 2 INJECTION, SOLUTION INTRAMUSCULAR; INTRAVENOUS; SUBCUTANEOUS at 05:05

## 2024-05-21 NOTE — ED TRIAGE NOTES
Chief Complaint   Patient presents with    Flank Pain     Pt presents to ed with c/o left flank pain for 1 week. Was diagnosed with kidney infection. Given antibiotics and then they changed them and he could not afford the second prescription

## 2024-05-21 NOTE — ED NOTES
Transport information sent to [facility] for transfer to [facility] in [place], [place] - confirmation # [id number].

## 2024-05-21 NOTE — ED PROVIDER NOTES
Encounter Date: 5/20/2024    SCRIBE #1 NOTE: I, Andra Barreto, am scribing for, and in the presence of,  Sanjeev Benites MD. I have scribed the entire note.       History     Chief Complaint   Patient presents with    Flank Pain     Pt presents to ed with c/o left flank pain for 1 week. Was diagnosed with kidney infection. Given antibiotics and then they changed them and he could not afford the second prescription     This is a 57 y/o male,who presents to the ED with complaints of left sided flank pain which started last week. He notes he was here on 5/11 and was told he had a 9 mm obstructing left kidney stone and was written an RX for Bactrim, Phenergan, Norco, and Flomax. He reports chills. There are no other complaints/pain in the ED at this time.     The history is provided by the patient. No  was used.     Review of patient's allergies indicates:  No Known Allergies  History reviewed. No pertinent past medical history.  Past Surgical History:   Procedure Laterality Date    NECK SURGERY       No family history on file.  Social History     Tobacco Use    Smoking status: Every Day     Current packs/day: 1.00     Types: Cigarettes    Smokeless tobacco: Never   Substance Use Topics    Alcohol use: Never     Review of Systems   Constitutional:  Positive for chills.   Genitourinary:  Positive for flank pain (Left sided flank pain.).   All other systems reviewed and are negative.      Physical Exam     Initial Vitals   BP Pulse Resp Temp SpO2   05/20/24 2141 05/20/24 2138 05/20/24 2138 05/20/24 2138 05/20/24 2138   (!) 195/83 62 16 98.3 °F (36.8 °C) 98 %      MAP       --                Physical Exam    Nursing note and vitals reviewed.  Constitutional: He appears well-developed and well-nourished.   HENT:   Head: Normocephalic and atraumatic.   Eyes: EOM are normal. Pupils are equal, round, and reactive to light.   Neck: Neck supple. No thyromegaly present.   Normal range of  motion.  Cardiovascular:  Normal rate, regular rhythm, normal heart sounds and intact distal pulses.           No murmur heard.  Pulmonary/Chest: Breath sounds normal. No respiratory distress. He has no wheezes.   Abdominal: Abdomen is soft. Bowel sounds are normal. There is no abdominal tenderness.   Musculoskeletal:         General: No tenderness (Left sided flank tenderness.) or edema. Normal range of motion.      Cervical back: Normal range of motion and neck supple.     Lymphadenopathy:     He has no cervical adenopathy.   Neurological: He is alert and oriented to person, place, and time. He has normal strength and normal reflexes. No cranial nerve deficit or sensory deficit. GCS score is 15. GCS eye subscore is 4. GCS verbal subscore is 5. GCS motor subscore is 6.   Skin: Skin is warm and dry. Capillary refill takes less than 2 seconds. No rash noted.   Psychiatric: He has a normal mood and affect.         ED Course   Procedures  Labs Reviewed   CULTURE, URINE - Abnormal; Notable for the following components:       Result Value    Culture, Urine >100,000 Gram-negative Bacilli (*)     All other components within normal limits   URINALYSIS, REFLEX TO URINE CULTURE - Abnormal; Notable for the following components:    Leukocytes, UA Large (*)     Nitrites, UA Positive (*)     Protein, UA 20 (*)     Blood, UA Small (*)     All other components within normal limits   URINALYSIS, MICROSCOPIC - Abnormal; Notable for the following components:    WBC, UA >182 (*)     RBC, UA 7 (*)     Bacteria, UA Many (*)     Squamous Epithelial Cells, UA Occasional (*)     Mucous Occasional (*)     All other components within normal limits   COMPREHENSIVE METABOLIC PANEL - Abnormal; Notable for the following components:    Anion Gap 6 (*)     All other components within normal limits   CBC WITH DIFFERENTIAL - Abnormal; Notable for the following components:    WBC 12.31 (*)     MCHC 31.7 (*)     Neutrophils % 78.1 (*)     Lymphocytes %  10.6 (*)     Monocytes % 9.1 (*)     Eosinophils % 0.8 (*)     Immature Granulocytes % 0.9 (*)     Neutrophils, Abs 9.61 (*)     Monocytes, Absolute 1.12 (*)     Immature Granulocytes, Absolute 0.11 (*)     All other components within normal limits   CULTURE, BLOOD   CULTURE, BLOOD   CBC W/ AUTO DIFFERENTIAL    Narrative:     The following orders were created for panel order CBC auto differential.  Procedure                               Abnormality         Status                     ---------                               -----------         ------                     CBC with Differential[[id number]]       Abnormal            Final result                 Please view results for these tests on the individual orders.          Imaging Results    None          Medications   lactated ringers bolus 1,000 mL (1,000 mLs Intravenous New Bag 5/21/24 1420)   cefTRIAXone (Rocephin) 1 g in dextrose 5 % in water (D5W) 100 mL IVPB (MB+) (0 g Intravenous Stopped 5/21/24 0011)   ketorolac injection 30 mg (30 mg Intravenous Given 5/20/24 2311)   morphine injection 4 mg (4 mg Intravenous Given 5/21/24 0017)   ondansetron injection 4 mg (4 mg Intravenous Given 5/21/24 0017)   fentaNYL injection 50 mcg (50 mcg Intravenous Given 5/21/24 0326)   ondansetron injection 4 mg (4 mg Intravenous Given 5/21/24 0731)   HYDROmorphone (PF) injection 1 mg (1 mg Intravenous Given 5/21/24 0946)     Medical Decision Making  Amount and/or Complexity of Data Reviewed  Labs: ordered.    Risk  Prescription drug management.              Attending Attestation:           Physician Attestation for Scribe:  Physician Attestation Statement for Scribe #1: ISanjeev MD, reviewed documentation, as scribed by Andra Barreto in my presence, and it is both accurate and complete.             ED Course as of 05/21/24 1424   Tue May 21, 2024   0609  Care transferred to Dr. Sellers [HK]      ED Course User Index  [HK] Sanjeev Benites MD                            Clinical Impression:  Final diagnoses:  [N39.0] Urinary tract infection without hematuria, site unspecified (Primary)  [N20.1] Ureterolithiasis          ED Disposition Condition    Transfer to Another Facility Stable                Brijesh Sellers MD  05/21/24 0012

## 2024-05-23 LAB — UA COMPLETE W REFLEX CULTURE PNL UR: ABNORMAL

## 2024-05-26 LAB
BACTERIA BLD CULT: NORMAL
BACTERIA BLD CULT: NORMAL

## 2024-06-04 ENCOUNTER — HOSPITAL ENCOUNTER (EMERGENCY)
Facility: HOSPITAL | Age: 59
Discharge: HOME | End: 2024-06-05
Attending: EMERGENCY MEDICINE

## 2024-06-04 DIAGNOSIS — N39.0 URINARY TRACT INFECTION WITHOUT HEMATURIA, SITE UNSPECIFIED: Primary | ICD-10-CM

## 2024-06-04 DIAGNOSIS — I63.9 STROKE: ICD-10-CM

## 2024-06-04 DIAGNOSIS — R20.2 PARESTHESIA OF LEFT ARM AND LEG: ICD-10-CM

## 2024-06-04 DIAGNOSIS — R53.1 GENERAL WEAKNESS: ICD-10-CM

## 2024-06-04 LAB
ALBUMIN SERPL BCP-MCNC: 3.4 G/DL (ref 3.5–5)
ALBUMIN/GLOB SERPL: 0.9 {RATIO}
ALP SERPL-CCNC: 97 U/L (ref 45–115)
ALT SERPL W P-5'-P-CCNC: 21 U/L (ref 16–61)
ANION GAP SERPL CALCULATED.3IONS-SCNC: 7 MMOL/L (ref 7–16)
APTT PPP: 30.7 SECONDS (ref 25.2–37.3)
AST SERPL W P-5'-P-CCNC: 16 U/L (ref 15–37)
BASOPHILS # BLD AUTO: 0.06 K/UL (ref 0–0.2)
BASOPHILS NFR BLD AUTO: 0.7 % (ref 0–1)
BILIRUB SERPL-MCNC: 0.3 MG/DL (ref ?–1.2)
BUN SERPL-MCNC: 14 MG/DL (ref 7–18)
BUN/CREAT SERPL: 14 (ref 6–20)
CALCIUM SERPL-MCNC: 9 MG/DL (ref 8.5–10.1)
CHLORIDE SERPL-SCNC: 111 MMOL/L (ref 98–107)
CHOLEST SERPL-MCNC: 137 MG/DL (ref 0–200)
CHOLEST/HDLC SERPL: 2.9 {RATIO}
CK SERPL-CCNC: 102 U/L (ref 39–308)
CO2 SERPL-SCNC: 25 MMOL/L (ref 21–32)
CREAT SERPL-MCNC: 0.97 MG/DL (ref 0.7–1.3)
DIFFERENTIAL METHOD BLD: ABNORMAL
EGFR (NO RACE VARIABLE) (RUSH/TITUS): 90 ML/MIN/1.73M2
EOSINOPHIL # BLD AUTO: 0.09 K/UL (ref 0–0.5)
EOSINOPHIL NFR BLD AUTO: 1 % (ref 1–4)
ERYTHROCYTE [DISTWIDTH] IN BLOOD BY AUTOMATED COUNT: 13.5 % (ref 11.5–14.5)
GLOBULIN SER-MCNC: 3.6 G/DL (ref 2–4)
GLUCOSE SERPL-MCNC: 74 MG/DL (ref 74–106)
HCT VFR BLD AUTO: 45.5 % (ref 40–54)
HDLC SERPL-MCNC: 48 MG/DL (ref 40–60)
HGB BLD-MCNC: 14.2 G/DL (ref 13.5–18)
IMM GRANULOCYTES # BLD AUTO: 0.03 K/UL (ref 0–0.04)
IMM GRANULOCYTES NFR BLD: 0.3 % (ref 0–0.4)
INR BLD: 0.88
LDLC SERPL CALC-MCNC: 53 MG/DL
LDLC/HDLC SERPL: 1.1 {RATIO}
LYMPHOCYTES # BLD AUTO: 2.48 K/UL (ref 1–4.8)
LYMPHOCYTES NFR BLD AUTO: 27.6 % (ref 27–41)
MAGNESIUM SERPL-MCNC: 2.2 MG/DL (ref 1.7–2.3)
MCH RBC QN AUTO: 28.9 PG (ref 27–31)
MCHC RBC AUTO-ENTMCNC: 31.2 G/DL (ref 32–36)
MCV RBC AUTO: 92.7 FL (ref 80–96)
MONOCYTES # BLD AUTO: 0.89 K/UL (ref 0–0.8)
MONOCYTES NFR BLD AUTO: 9.9 % (ref 2–6)
MPC BLD CALC-MCNC: 11 FL (ref 9.4–12.4)
NEUTROPHILS # BLD AUTO: 5.43 K/UL (ref 1.8–7.7)
NEUTROPHILS NFR BLD AUTO: 60.5 % (ref 53–65)
NONHDLC SERPL-MCNC: 89 MG/DL
NRBC # BLD AUTO: 0 X10E3/UL
NRBC, AUTO (.00): 0 %
PLATELET # BLD AUTO: 240 K/UL (ref 150–400)
POTASSIUM SERPL-SCNC: 3.8 MMOL/L (ref 3.5–5.1)
PROT SERPL-MCNC: 7 G/DL (ref 6.4–8.2)
PROTHROMBIN TIME: 11.9 SECONDS (ref 11.7–14.7)
RBC # BLD AUTO: 4.91 M/UL (ref 4.6–6.2)
SODIUM SERPL-SCNC: 139 MMOL/L (ref 136–145)
TRIGL SERPL-MCNC: 178 MG/DL (ref 35–150)
VLDLC SERPL-MCNC: 36 MG/DL
WBC # BLD AUTO: 8.98 K/UL (ref 4.5–11)

## 2024-06-04 PROCEDURE — 80053 COMPREHEN METABOLIC PANEL: CPT | Performed by: EMERGENCY MEDICINE

## 2024-06-04 PROCEDURE — 25000003 PHARM REV CODE 250: Performed by: EMERGENCY MEDICINE

## 2024-06-04 PROCEDURE — 87086 URINE CULTURE/COLONY COUNT: CPT | Performed by: EMERGENCY MEDICINE

## 2024-06-04 PROCEDURE — 96374 THER/PROPH/DIAG INJ IV PUSH: CPT

## 2024-06-04 PROCEDURE — 36415 COLL VENOUS BLD VENIPUNCTURE: CPT | Performed by: EMERGENCY MEDICINE

## 2024-06-04 PROCEDURE — 83735 ASSAY OF MAGNESIUM: CPT | Performed by: EMERGENCY MEDICINE

## 2024-06-04 PROCEDURE — 85730 THROMBOPLASTIN TIME PARTIAL: CPT | Performed by: EMERGENCY MEDICINE

## 2024-06-04 PROCEDURE — 82550 ASSAY OF CK (CPK): CPT | Performed by: EMERGENCY MEDICINE

## 2024-06-04 PROCEDURE — 99285 EMERGENCY DEPT VISIT HI MDM: CPT | Mod: 25

## 2024-06-04 PROCEDURE — 85025 COMPLETE CBC W/AUTO DIFF WBC: CPT | Performed by: EMERGENCY MEDICINE

## 2024-06-04 PROCEDURE — 81001 URINALYSIS AUTO W/SCOPE: CPT | Mod: XB | Performed by: EMERGENCY MEDICINE

## 2024-06-04 PROCEDURE — 80307 DRUG TEST PRSMV CHEM ANLYZR: CPT | Performed by: EMERGENCY MEDICINE

## 2024-06-04 PROCEDURE — 96361 HYDRATE IV INFUSION ADD-ON: CPT

## 2024-06-04 PROCEDURE — 93010 ELECTROCARDIOGRAM REPORT: CPT | Mod: ,,, | Performed by: HOSPITALIST

## 2024-06-04 PROCEDURE — 85610 PROTHROMBIN TIME: CPT | Performed by: EMERGENCY MEDICINE

## 2024-06-04 PROCEDURE — 80061 LIPID PANEL: CPT | Performed by: EMERGENCY MEDICINE

## 2024-06-04 PROCEDURE — 93005 ELECTROCARDIOGRAM TRACING: CPT

## 2024-06-04 RX ADMIN — SODIUM CHLORIDE 1000 ML: 9 INJECTION, SOLUTION INTRAVENOUS at 11:06

## 2024-06-05 VITALS
DIASTOLIC BLOOD PRESSURE: 68 MMHG | HEIGHT: 71 IN | TEMPERATURE: 98 F | SYSTOLIC BLOOD PRESSURE: 140 MMHG | BODY MASS INDEX: 25.2 KG/M2 | WEIGHT: 180 LBS | RESPIRATION RATE: 13 BRPM | HEART RATE: 60 BPM | OXYGEN SATURATION: 97 %

## 2024-06-05 LAB
AMPHET UR QL SCN: NEGATIVE
BACTERIA #/AREA URNS HPF: ABNORMAL /HPF
BARBITURATES UR QL SCN: NEGATIVE
BENZODIAZ METAB UR QL SCN: NEGATIVE
BILIRUB UR QL STRIP: NEGATIVE
CANNABINOIDS UR QL SCN: POSITIVE
CLARITY UR: CLEAR
COCAINE UR QL SCN: NEGATIVE
COLOR UR: ABNORMAL
GLUCOSE UR STRIP-MCNC: NORMAL MG/DL
KETONES UR STRIP-SCNC: NEGATIVE MG/DL
LEUKOCYTE ESTERASE UR QL STRIP: ABNORMAL
MUCOUS, UA: ABNORMAL /LPF
NITRITE UR QL STRIP: NEGATIVE
OHS QRS DURATION: 102 MS
OHS QTC CALCULATION: 403 MS
OPIATES UR QL SCN: NEGATIVE
PCP UR QL SCN: NEGATIVE
PH UR STRIP: 6 PH UNITS
PROT UR QL STRIP: NEGATIVE
RBC # UR STRIP: NEGATIVE /UL
RBC #/AREA URNS HPF: 2 /HPF
SP GR UR STRIP: 1.02
SQUAMOUS #/AREA URNS LPF: ABNORMAL /HPF
UROBILINOGEN UR STRIP-ACNC: NORMAL MG/DL
WBC #/AREA URNS HPF: 117 /HPF

## 2024-06-05 PROCEDURE — 63600175 PHARM REV CODE 636 W HCPCS: Performed by: EMERGENCY MEDICINE

## 2024-06-05 PROCEDURE — 25000003 PHARM REV CODE 250: Performed by: EMERGENCY MEDICINE

## 2024-06-05 RX ORDER — NITROFURANTOIN 25; 75 MG/1; MG/1
100 CAPSULE ORAL 2 TIMES DAILY
Qty: 20 CAPSULE | Refills: 0 | Status: SHIPPED | OUTPATIENT
Start: 2024-06-05 | End: 2024-06-15

## 2024-06-05 RX ADMIN — CEFTRIAXONE SODIUM 1 G: 1 INJECTION, POWDER, FOR SOLUTION INTRAMUSCULAR; INTRAVENOUS at 12:06

## 2024-06-05 NOTE — DISCHARGE INSTRUCTIONS
Start taking prescription antibiotic    Start taking aspirin 81 mg per day     Follow-up with neurology.      Also start seeing a primary care doctor regularly

## 2024-06-05 NOTE — ED PROVIDER NOTES
Encounter Date: 6/4/2024    SCRIBE #1 NOTE: I, Kaleigh Wesley, am scribing for, and in the presence of,  Connor Srivastava MD. I have scribed the entire note.       History     Chief Complaint   Patient presents with    Extremity Weakness     Patient reports that he has not felt right for 2 days. States the left side of his face feels numb and he feels weak in his left arm and leg. Recently discharged from hospital in [place] s/p renal stent. Denies any other hx. No slurred speech or facial droop noted in triage.      58 y.o.male presented to the ED for extremity weakness. PT stated that he has been feeling weak with abdominal pain from a surgery that happened last week due to having kidney stones.PT stated that he has a numbness in his left side of his body, and diarrhea that started 3 days ago, but denies cough, fever, and chills. Pt also stated that he has been weaker in the left arm going on a year.PT has HX of smoking and has no medical HX on file.     The history is provided by the patient. No  was used.     Review of patient's allergies indicates:  No Known Allergies  Past Medical History:   Diagnosis Date    Left ureteral stone     9 mm     Past Surgical History:   Procedure Laterality Date    NECK SURGERY       No family history on file.  Social History     Tobacco Use    Smoking status: Every Day     Current packs/day: 1.00     Types: Cigarettes    Smokeless tobacco: Never   Substance Use Topics    Alcohol use: Never    Drug use: Not Currently     Review of Systems   Constitutional:  Negative for chills and fever.   Respiratory:  Negative for cough and chest tightness.    Cardiovascular:  Negative for chest pain.   Gastrointestinal:  Positive for abdominal pain and diarrhea.   Neurological:  Positive for weakness and numbness. Negative for headaches.   All other systems reviewed and are negative.      Physical Exam     Initial Vitals [06/04/24 2222]   BP Pulse Resp Temp SpO2   (!)  149/64 (!) 58 16 97.8 °F (36.6 °C) 100 %      MAP       --         Physical Exam    Nursing note and vitals reviewed.  Constitutional: He appears well-developed and well-nourished.   HENT:   Head: Normocephalic and atraumatic.   Eyes: EOM are normal. Pupils are equal, round, and reactive to light.   Neck: Neck supple. No thyromegaly present. No JVD present.   Normal range of motion.  Cardiovascular:  Normal rate, regular rhythm, normal heart sounds and intact distal pulses.           No murmur heard.  Pulmonary/Chest: Breath sounds normal. No stridor. No respiratory distress. He has no wheezes.   Abdominal: Abdomen is soft. Bowel sounds are normal. He exhibits no distension. There is abdominal tenderness.   Musculoskeletal:         General: Tenderness (abdominal tenderness) present. No edema. Normal range of motion.      Cervical back: Normal range of motion and neck supple.     Lymphadenopathy:     He has no cervical adenopathy.   Neurological: He is alert and oriented to person, place, and time. He has normal strength. No cranial nerve deficit or sensory deficit. GCS score is 15. GCS eye subscore is 4. GCS verbal subscore is 5. GCS motor subscore is 6.   Skin: Skin is warm and dry. Capillary refill takes less than 2 seconds. No rash noted.   Psychiatric: He has a normal mood and affect.         ED Course   Procedures  Labs Reviewed   COMPREHENSIVE METABOLIC PANEL - Abnormal; Notable for the following components:       Result Value    Chloride 111 (*)     Albumin 3.4 (*)     All other components within normal limits   URINALYSIS, REFLEX TO URINE CULTURE - Abnormal; Notable for the following components:    Leukocytes, UA Moderate (*)     All other components within normal limits   LIPID PANEL - Abnormal; Notable for the following components:    Triglycerides 178 (*)     All other components within normal limits   DRUG SCREEN, URINE (BEAKER) - Abnormal; Notable for the following components:    Cannabinoid, Urine  Positive (*)     All other components within normal limits    Narrative:     The results of screening tests should be considered presumptive. Confirmatory testing is available upon request.    Cutoff Points:  PCP:         25ng/mL  AMPH:        500ng/mL  DEEJAY:        200ng/mL  LINA:        200ng/mL  THC:         50ng/mL  RADHA:         300ng/mL  OPI:         2000ng/mL   CBC WITH DIFFERENTIAL - Abnormal; Notable for the following components:    MCHC 31.2 (*)     Monocytes % 9.9 (*)     Monocytes, Absolute 0.89 (*)     All other components within normal limits   URINALYSIS, MICROSCOPIC - Abnormal; Notable for the following components:    WBC,  (*)     Bacteria, UA Many (*)     Squamous Epithelial Cells, UA Occasional (*)     Mucous Occasional (*)     All other components within normal limits   MAGNESIUM - Normal   PROTIME-INR - Normal   APTT - Normal   CK - Normal   CULTURE, URINE   CBC W/ AUTO DIFFERENTIAL    Narrative:     The following orders were created for panel order CBC auto differential.  Procedure                               Abnormality         Status                     ---------                               -----------         ------                     CBC with Differential[[id number]]       Abnormal            Final result                 Please view results for these tests on the individual orders.          Imaging Results              CT Head Without Contrast (In process)                      Medications   cefTRIAXone (Rocephin) 1 g in dextrose 5 % in water (D5W) 100 mL IVPB (MB+) (1 g Intravenous New Bag 6/5/24 0037)   sodium chloride 0.9% bolus 1,000 mL 1,000 mL (0 mLs Intravenous Stopped 6/5/24 0044)     Medical Decision Making  MDM    Patient presents for emergent evaluation of acute  paresthesias left side of the face left arm and left leg that poses a threat to life and/or bodily function.   Also some generalized fatigue.  No associated flank pain to imply kidney stone or obstructive  pathology.  Patient has had no new neurologic symptoms in the past 48 hours to imply an acute stroke.  In the ED patient found to have acute   UTI.  Paresthesias.    I ordered labs and personally reviewed them.  Labs significant for  pyuria bacteriuria  I ordered EKG and personally reviewed it.  EKG significant for  sinus arrhythmia rate 55.  No ST elevation.  Poor R-wave progression..    I ordered CT scan and personally reviewed it and reviewed the radiologist interpretation.  CT significant for  CT head no acute abnormality.      Discharge MDM  Patient was managed in the ED with IV   Normal saline Rocephin.    The response to treatment was  improved..    Patient was discharged in stable condition.  Detailed return precautions discussed.       As per after visit summary:    Start taking prescription antibiotic    Start taking aspirin 81 mg per day     Follow-up with neurology.      Also start seeing a primary care doctor regularly    Amount and/or Complexity of Data Reviewed  Labs: ordered. Decision-making details documented in ED Course.  Radiology: ordered.    Risk  Prescription drug management.              Attending Attestation:           Physician Attestation for Scribe:  Physician Attestation Statement for Scribe #1: I, Connor Srivastava MD, reviewed documentation, as scribed by Kaleigh Wesley in my presence, and it is both accurate and complete.             ED Course as of 06/05/24 0046   Wed Jun 05, 2024   0021 Triglycerides(!): 178 [PK]      ED Course User Index  [PK] Connor Srivastava MD                           Clinical Impression:  Final diagnoses:  [I63.9] Stroke  [R53.1] General weakness  [N39.0] Urinary tract infection without hematuria, site unspecified (Primary)  [R20.2] Paresthesia of left arm and leg          ED Disposition Condition    Discharge Stable          ED Prescriptions       Medication Sig Dispense Start Date End Date Auth. Provider    nitrofurantoin,  macrocrystal-monohydrate, (MACROBID) 100 MG capsule Take 1 capsule (100 mg total) by mouth 2 (two) times daily. for 10 days 20 capsule 6/5/2024 6/15/2024 Connor Srivastava MD          Follow-up Information       Follow up With Specialties Details Why Contact Info    [facility] - Emergency Department Emergency Medicine Go to  As needed, If symptoms worsen [address] [id number] [phone]    Umer Sahni MD Neurology Schedule an appointment as soon as possible for a visit   [address] [facility] [address]  [phone]               Connor Srivastava MD  06/05/24 0046

## 2024-06-10 LAB
UA COMPLETE W REFLEX CULTURE PNL UR: ABNORMAL
UA COMPLETE W REFLEX CULTURE PNL UR: ABNORMAL

## 2024-06-11 ENCOUNTER — TELEPHONE (OUTPATIENT)
Dept: EMERGENCY MEDICINE | Facility: HOSPITAL | Age: 59
End: 2024-06-11

## 2024-06-11 RX ORDER — DOXYCYCLINE 100 MG/1
100 CAPSULE ORAL 2 TIMES DAILY
Qty: 20 CAPSULE | Refills: 0 | Status: SHIPPED | OUTPATIENT
Start: 2024-06-11 | End: 2024-06-21

## 2024-06-11 NOTE — TELEPHONE ENCOUNTER
----- Message from JOSH Ward sent at 6/11/2024  8:26 AM CDT -----  Please notify him that I sent in another RX for antibiotics that he needs to take as directed and also have him take the macrobid he was prescribed as well

## 2024-06-11 NOTE — TELEPHONE ENCOUNTER
"Pt states that his "specialist" in [place] took him off the macrobid yesterday & started him on Uretron DS. Advised pt that the Rx they gave him was for pain/spasms in the bladder. Advised pt that d/t his results he is going to need to take both the antibiotics as previously explained to him. Pt continues to state that he is going to do what his doctor in [place] told him. Advised pt that is his right but the providers at [facility] are treating him based on his results. Pt states that he is going to go back to his doctor in [place]. I advised pt that he could stop by the ED to get his results that I would print them out for him so his doctor can see why the providers have told him what Rxs he needs to take.   "

## 2024-06-26 ENCOUNTER — HOSPITAL ENCOUNTER (INPATIENT)
Facility: HOSPITAL | Age: 59
LOS: 2 days | Discharge: HOME | DRG: 660 | End: 2024-06-28
Attending: EMERGENCY MEDICINE | Admitting: FAMILY MEDICINE

## 2024-06-26 DIAGNOSIS — R07.9 CHEST PAIN: ICD-10-CM

## 2024-06-26 DIAGNOSIS — N20.0 NEPHROLITHIASIS: ICD-10-CM

## 2024-06-26 PROBLEM — E87.6 HYPOKALEMIA: Status: ACTIVE | Noted: 2024-06-26

## 2024-06-26 PROBLEM — N30.00 ACUTE CYSTITIS WITHOUT HEMATURIA: Status: ACTIVE | Noted: 2024-06-26

## 2024-06-26 PROBLEM — N10 ACUTE PYELONEPHRITIS: Status: ACTIVE | Noted: 2024-06-26

## 2024-06-26 LAB
ALBUMIN SERPL BCP-MCNC: 3.7 G/DL (ref 3.5–5)
ALBUMIN/GLOB SERPL: 1 {RATIO}
ALP SERPL-CCNC: 77 U/L (ref 45–115)
ALT SERPL W P-5'-P-CCNC: 16 U/L (ref 16–61)
ANION GAP SERPL CALCULATED.3IONS-SCNC: 9 MMOL/L (ref 7–16)
AST SERPL W P-5'-P-CCNC: 12 U/L (ref 15–37)
BACTERIA #/AREA URNS HPF: ABNORMAL /HPF
BASOPHILS # BLD AUTO: 0.03 K/UL (ref 0–0.2)
BASOPHILS NFR BLD AUTO: 0.2 % (ref 0–1)
BILIRUB SERPL-MCNC: 0.5 MG/DL (ref ?–1.2)
BILIRUB UR QL STRIP: NEGATIVE
BUN SERPL-MCNC: 14 MG/DL (ref 7–18)
BUN/CREAT SERPL: 14 (ref 6–20)
CALCIUM SERPL-MCNC: 9 MG/DL (ref 8.5–10.1)
CHLORIDE SERPL-SCNC: 110 MMOL/L (ref 98–107)
CLARITY UR: ABNORMAL
CO2 SERPL-SCNC: 20 MMOL/L (ref 21–32)
COLOR UR: YELLOW
CREAT SERPL-MCNC: 0.98 MG/DL (ref 0.7–1.3)
DIFFERENTIAL METHOD BLD: ABNORMAL
EGFR (NO RACE VARIABLE) (RUSH/TITUS): 89 ML/MIN/1.73M2
EOSINOPHIL # BLD AUTO: 0.02 K/UL (ref 0–0.5)
EOSINOPHIL NFR BLD AUTO: 0.1 % (ref 1–4)
ERYTHROCYTE [DISTWIDTH] IN BLOOD BY AUTOMATED COUNT: 13.5 % (ref 11.5–14.5)
GLOBULIN SER-MCNC: 3.7 G/DL (ref 2–4)
GLUCOSE SERPL-MCNC: 119 MG/DL (ref 74–106)
GLUCOSE UR STRIP-MCNC: NORMAL MG/DL
HCT VFR BLD AUTO: 50 % (ref 40–54)
HGB BLD-MCNC: 15.9 G/DL (ref 13.5–18)
IMM GRANULOCYTES # BLD AUTO: 0.07 K/UL (ref 0–0.04)
IMM GRANULOCYTES NFR BLD: 0.5 % (ref 0–0.4)
KETONES UR STRIP-SCNC: NEGATIVE MG/DL
LEUKOCYTE ESTERASE UR QL STRIP: ABNORMAL
LYMPHOCYTES # BLD AUTO: 1.26 K/UL (ref 1–4.8)
LYMPHOCYTES NFR BLD AUTO: 9.4 % (ref 27–41)
MCH RBC QN AUTO: 28.5 PG (ref 27–31)
MCHC RBC AUTO-ENTMCNC: 31.8 G/DL (ref 32–36)
MCV RBC AUTO: 89.6 FL (ref 80–96)
MONOCYTES # BLD AUTO: 1.04 K/UL (ref 0–0.8)
MONOCYTES NFR BLD AUTO: 7.8 % (ref 2–6)
MPC BLD CALC-MCNC: 9.9 FL (ref 9.4–12.4)
MUCOUS, UA: ABNORMAL /LPF
NEUTROPHILS # BLD AUTO: 10.93 K/UL (ref 1.8–7.7)
NEUTROPHILS NFR BLD AUTO: 82 % (ref 53–65)
NITRITE UR QL STRIP: POSITIVE
NRBC # BLD AUTO: 0 X10E3/UL
NRBC, AUTO (.00): 0 %
PH UR STRIP: 6 PH UNITS
PLATELET # BLD AUTO: 202 K/UL (ref 150–400)
POTASSIUM SERPL-SCNC: 3.3 MMOL/L (ref 3.5–5.1)
PROT SERPL-MCNC: 7.4 G/DL (ref 6.4–8.2)
PROT UR QL STRIP: 100
RBC # BLD AUTO: 5.58 M/UL (ref 4.6–6.2)
RBC # UR STRIP: ABNORMAL /UL
RBC #/AREA URNS HPF: 51 /HPF
SODIUM SERPL-SCNC: 136 MMOL/L (ref 136–145)
SP GR UR STRIP: 1.02
SQUAMOUS #/AREA URNS LPF: ABNORMAL /HPF
UROBILINOGEN UR STRIP-ACNC: NORMAL MG/DL
WBC # BLD AUTO: 13.35 K/UL (ref 4.5–11)
WBC #/AREA URNS HPF: >182 /HPF

## 2024-06-26 PROCEDURE — 11000001 HC ACUTE MED/SURG PRIVATE ROOM

## 2024-06-26 PROCEDURE — 80053 COMPREHEN METABOLIC PANEL: CPT | Performed by: EMERGENCY MEDICINE

## 2024-06-26 PROCEDURE — 96366 THER/PROPH/DIAG IV INF ADDON: CPT

## 2024-06-26 PROCEDURE — 25000003 PHARM REV CODE 250: Performed by: FAMILY MEDICINE

## 2024-06-26 PROCEDURE — 99285 EMERGENCY DEPT VISIT HI MDM: CPT | Mod: 25

## 2024-06-26 PROCEDURE — 96365 THER/PROPH/DIAG IV INF INIT: CPT

## 2024-06-26 PROCEDURE — 96372 THER/PROPH/DIAG INJ SC/IM: CPT | Performed by: FAMILY MEDICINE

## 2024-06-26 PROCEDURE — 63600175 PHARM REV CODE 636 W HCPCS: Performed by: EMERGENCY MEDICINE

## 2024-06-26 PROCEDURE — 63600175 PHARM REV CODE 636 W HCPCS: Performed by: FAMILY MEDICINE

## 2024-06-26 PROCEDURE — 87077 CULTURE AEROBIC IDENTIFY: CPT | Performed by: EMERGENCY MEDICINE

## 2024-06-26 PROCEDURE — 96376 TX/PRO/DX INJ SAME DRUG ADON: CPT

## 2024-06-26 PROCEDURE — 81001 URINALYSIS AUTO W/SCOPE: CPT | Performed by: EMERGENCY MEDICINE

## 2024-06-26 PROCEDURE — 96375 TX/PRO/DX INJ NEW DRUG ADDON: CPT

## 2024-06-26 PROCEDURE — 85025 COMPLETE CBC W/AUTO DIFF WBC: CPT | Performed by: EMERGENCY MEDICINE

## 2024-06-26 PROCEDURE — 25000003 PHARM REV CODE 250: Performed by: EMERGENCY MEDICINE

## 2024-06-26 PROCEDURE — 96367 TX/PROPH/DG ADDL SEQ IV INF: CPT

## 2024-06-26 PROCEDURE — 87086 URINE CULTURE/COLONY COUNT: CPT | Performed by: EMERGENCY MEDICINE

## 2024-06-26 PROCEDURE — 81003 URINALYSIS AUTO W/O SCOPE: CPT | Performed by: EMERGENCY MEDICINE

## 2024-06-26 PROCEDURE — 87186 SC STD MICRODIL/AGAR DIL: CPT | Performed by: EMERGENCY MEDICINE

## 2024-06-26 PROCEDURE — 99223 1ST HOSP IP/OBS HIGH 75: CPT | Mod: ,,, | Performed by: FAMILY MEDICINE

## 2024-06-26 RX ORDER — IBUPROFEN 200 MG
16 TABLET ORAL
Status: DISCONTINUED | OUTPATIENT
Start: 2024-06-26 | End: 2024-06-28 | Stop reason: HOSPADM

## 2024-06-26 RX ORDER — IBUPROFEN 200 MG
24 TABLET ORAL
Status: DISCONTINUED | OUTPATIENT
Start: 2024-06-26 | End: 2024-06-28 | Stop reason: HOSPADM

## 2024-06-26 RX ORDER — ONDANSETRON 4 MG/1
8 TABLET, ORALLY DISINTEGRATING ORAL EVERY 8 HOURS PRN
Status: DISCONTINUED | OUTPATIENT
Start: 2024-06-26 | End: 2024-06-28 | Stop reason: HOSPADM

## 2024-06-26 RX ORDER — GLUCAGON 1 MG
1 KIT INJECTION
Status: DISCONTINUED | OUTPATIENT
Start: 2024-06-26 | End: 2024-06-28 | Stop reason: HOSPADM

## 2024-06-26 RX ORDER — SODIUM CHLORIDE, SODIUM LACTATE, POTASSIUM CHLORIDE, CALCIUM CHLORIDE 600; 310; 30; 20 MG/100ML; MG/100ML; MG/100ML; MG/100ML
INJECTION, SOLUTION INTRAVENOUS CONTINUOUS
Status: DISCONTINUED | OUTPATIENT
Start: 2024-06-26 | End: 2024-06-28 | Stop reason: HOSPADM

## 2024-06-26 RX ORDER — PROMETHAZINE HYDROCHLORIDE 25 MG/1
25 TABLET ORAL EVERY 6 HOURS PRN
Status: DISCONTINUED | OUTPATIENT
Start: 2024-06-26 | End: 2024-06-28 | Stop reason: HOSPADM

## 2024-06-26 RX ORDER — TAMSULOSIN HYDROCHLORIDE 0.4 MG/1
0.4 CAPSULE ORAL DAILY
Status: DISCONTINUED | OUTPATIENT
Start: 2024-06-26 | End: 2024-06-28 | Stop reason: HOSPADM

## 2024-06-26 RX ORDER — HEPARIN SODIUM 5000 [USP'U]/ML
5000 INJECTION, SOLUTION INTRAVENOUS; SUBCUTANEOUS EVERY 8 HOURS
Status: DISCONTINUED | OUTPATIENT
Start: 2024-06-26 | End: 2024-06-26

## 2024-06-26 RX ORDER — HYDROMORPHONE HYDROCHLORIDE 2 MG/ML
1 INJECTION, SOLUTION INTRAMUSCULAR; INTRAVENOUS; SUBCUTANEOUS EVERY 4 HOURS PRN
Status: DISCONTINUED | OUTPATIENT
Start: 2024-06-26 | End: 2024-06-28 | Stop reason: HOSPADM

## 2024-06-26 RX ORDER — HYDROMORPHONE HYDROCHLORIDE 2 MG/ML
1 INJECTION, SOLUTION INTRAMUSCULAR; INTRAVENOUS; SUBCUTANEOUS
Status: COMPLETED | OUTPATIENT
Start: 2024-06-26 | End: 2024-06-26

## 2024-06-26 RX ORDER — NALOXONE HCL 0.4 MG/ML
0.02 VIAL (ML) INJECTION
Status: DISCONTINUED | OUTPATIENT
Start: 2024-06-26 | End: 2024-06-28 | Stop reason: HOSPADM

## 2024-06-26 RX ORDER — POTASSIUM CHLORIDE 750 MG/1
10 TABLET, EXTENDED RELEASE ORAL 3 TIMES DAILY
Status: DISPENSED | OUTPATIENT
Start: 2024-06-26 | End: 2024-06-28

## 2024-06-26 RX ORDER — ACETAMINOPHEN 325 MG/1
650 TABLET ORAL EVERY 8 HOURS PRN
Status: DISCONTINUED | OUTPATIENT
Start: 2024-06-26 | End: 2024-06-28 | Stop reason: HOSPADM

## 2024-06-26 RX ORDER — SODIUM CHLORIDE 0.9 % (FLUSH) 0.9 %
10 SYRINGE (ML) INJECTION EVERY 12 HOURS PRN
Status: DISCONTINUED | OUTPATIENT
Start: 2024-06-26 | End: 2024-06-28 | Stop reason: HOSPADM

## 2024-06-26 RX ADMIN — POTASSIUM CHLORIDE 10 MEQ: 750 TABLET, EXTENDED RELEASE ORAL at 03:06

## 2024-06-26 RX ADMIN — MEROPENEM 1 G: 1 INJECTION, POWDER, FOR SOLUTION INTRAVENOUS at 01:06

## 2024-06-26 RX ADMIN — SODIUM CHLORIDE, POTASSIUM CHLORIDE, SODIUM LACTATE AND CALCIUM CHLORIDE: 600; 310; 30; 20 INJECTION, SOLUTION INTRAVENOUS at 04:06

## 2024-06-26 RX ADMIN — ONDANSETRON 8 MG: 4 TABLET, ORALLY DISINTEGRATING ORAL at 07:06

## 2024-06-26 RX ADMIN — VANCOMYCIN HYDROCHLORIDE 1750 MG: 500 INJECTION, POWDER, LYOPHILIZED, FOR SOLUTION INTRAVENOUS at 02:06

## 2024-06-26 RX ADMIN — HYDROMORPHONE HYDROCHLORIDE 1 MG: 2 INJECTION INTRAMUSCULAR; INTRAVENOUS; SUBCUTANEOUS at 11:06

## 2024-06-26 RX ADMIN — POTASSIUM CHLORIDE 10 MEQ: 750 TABLET, EXTENDED RELEASE ORAL at 08:06

## 2024-06-26 RX ADMIN — HYDROMORPHONE HYDROCHLORIDE 1 MG: 2 INJECTION INTRAMUSCULAR; INTRAVENOUS; SUBCUTANEOUS at 07:06

## 2024-06-26 RX ADMIN — HEPARIN SODIUM 5000 UNITS: 5000 INJECTION, SOLUTION INTRAVENOUS; SUBCUTANEOUS at 01:06

## 2024-06-26 RX ADMIN — PROMETHAZINE HYDROCHLORIDE 12.5 MG: 25 INJECTION INTRAMUSCULAR; INTRAVENOUS at 07:06

## 2024-06-26 RX ADMIN — TAMSULOSIN HYDROCHLORIDE 0.4 MG: 0.4 CAPSULE ORAL at 03:06

## 2024-06-26 RX ADMIN — SODIUM CHLORIDE, POTASSIUM CHLORIDE, SODIUM LACTATE AND CALCIUM CHLORIDE: 600; 310; 30; 20 INJECTION, SOLUTION INTRAVENOUS at 08:06

## 2024-06-26 RX ADMIN — MEROPENEM 1 G: 1 INJECTION, POWDER, FOR SOLUTION INTRAVENOUS at 09:06

## 2024-06-26 RX ADMIN — HYDROMORPHONE HYDROCHLORIDE 1 MG: 2 INJECTION INTRAMUSCULAR; INTRAVENOUS; SUBCUTANEOUS at 02:06

## 2024-06-26 NOTE — ED PROVIDER NOTES
Encounter Date: 6/26/2024       History     Chief Complaint   Patient presents with    Abdominal Pain    Flank Pain     L flank pain     Patient complains of severe left-sided flank pain radiating to the groin.  Associated with an episode of vomiting and with nausea.  Patient had had a left ureter obstructing 9 mm stone that had required transfer to [place] for ureteroscopy and placement of a stent.  That was a proximally 1-2 months ago.  Patient completely recover for that condition.  At that time he was living in his car so there was some social determinants of health issues.  Now he is living with his son.  Patient denies fever or chills.  No associated dysuria or hematuria      Review of patient's allergies indicates:  No Known Allergies  Past Medical History:   Diagnosis Date    Left ureteral stone     9 mm     Past Surgical History:   Procedure Laterality Date    NECK SURGERY       Family History   Problem Relation Name Age of Onset    Cancer Father       Social History     Tobacco Use    Smoking status: Every Day     Current packs/day: 2.00     Average packs/day: 2.0 packs/day for 35.0 years (70.0 ttl pk-yrs)     Types: Cigarettes     Start date: 6/26/1989    Smokeless tobacco: Never   Substance Use Topics    Alcohol use: Never    Drug use: Not Currently     Types: Marijuana     Review of Systems   Constitutional:  Negative for fever.   HENT:  Negative for sore throat.    Respiratory:  Negative for shortness of breath.    Cardiovascular:  Negative for chest pain.   Gastrointestinal:  Positive for abdominal pain (Left-sided flank pain), nausea and vomiting.   Genitourinary:  Negative for dysuria.   Musculoskeletal:  Negative for back pain.   Skin:  Negative for rash.   Neurological:  Negative for weakness.   Hematological:  Does not bruise/bleed easily.       Physical Exam     Initial Vitals [06/26/24 0627]   BP Pulse Resp Temp SpO2   (!) 176/99 70 (!) 28 97 °F (36.1 °C) 95 %      MAP       --         Physical  Exam    Nursing note and vitals reviewed.  Constitutional: He appears well-developed and well-nourished. He appears distressed.   Writhing in pain   HENT:   Head: Normocephalic and atraumatic.   Eyes: EOM are normal. Pupils are equal, round, and reactive to light.   Neck: Neck supple. No thyromegaly present. No JVD present.   Normal range of motion.  Cardiovascular:  Normal rate, regular rhythm, normal heart sounds and intact distal pulses.           No murmur heard.  Pulmonary/Chest: Breath sounds normal. No stridor. No respiratory distress. He has no wheezes.   Abdominal: Abdomen is soft. Bowel sounds are normal. He exhibits no distension. There is no abdominal tenderness.   Musculoskeletal:         General: No tenderness or edema. Normal range of motion.      Cervical back: Normal range of motion and neck supple.     Lymphadenopathy:     He has no cervical adenopathy.   Neurological: He is alert and oriented to person, place, and time. He has normal strength. No cranial nerve deficit or sensory deficit. GCS score is 15. GCS eye subscore is 4. GCS verbal subscore is 5. GCS motor subscore is 6.   Skin: Skin is warm and dry. Capillary refill takes less than 2 seconds. No rash noted.   Psychiatric: He has a normal mood and affect.         Medical Screening Exam   See Full Note    ED Course   Procedures  Labs Reviewed   COMPREHENSIVE METABOLIC PANEL - Abnormal; Notable for the following components:       Result Value    Potassium 3.3 (*)     Chloride 110 (*)     CO2 20 (*)     Glucose 119 (*)     AST 12 (*)     All other components within normal limits   URINALYSIS, REFLEX TO URINE CULTURE - Abnormal; Notable for the following components:    Leukocytes, UA Large (*)     Nitrites, UA Positive (*)     Protein,  (*)     Blood, UA Moderate (*)     All other components within normal limits   CBC WITH DIFFERENTIAL - Abnormal; Notable for the following components:    WBC 13.35 (*)     MCHC 31.8 (*)     Neutrophils %  82.0 (*)     Lymphocytes % 9.4 (*)     Monocytes % 7.8 (*)     Eosinophils % 0.1 (*)     Immature Granulocytes % 0.5 (*)     Neutrophils, Abs 10.93 (*)     Monocytes, Absolute 1.04 (*)     Immature Granulocytes, Absolute 0.07 (*)     All other components within normal limits   URINALYSIS, MICROSCOPIC - Abnormal; Notable for the following components:    WBC, UA >182 (*)     RBC, UA 51 (*)     Bacteria, UA Moderate (*)     Squamous Epithelial Cells, UA Occasional (*)     Mucous Occasional (*)     All other components within normal limits   CULTURE, URINE   CULTURE, URINE   CBC W/ AUTO DIFFERENTIAL    Narrative:     The following orders were created for panel order CBC auto differential.  Procedure                               Abnormality         Status                     ---------                               -----------         ------                     CBC with Differential[[id number]]       Abnormal            Final result                 Please view results for these tests on the individual orders.          Imaging Results              CT Renal Stone Study ABD Pelvis WO (Final result)  Result time 06/26/24 07:45:32      Final result by Rajinder Kirby II, MD (06/26/24 07:45:32)                   Impression:      Moderate left hydronephrosis with 14 mm calculus at left ureterovesical junction.  Cholelithiasis.  No other evidence of abnormality demonstrated      Electronically signed by: Rajinder Kirby  Date:    06/26/2024  Time:    07:45               Narrative:    EXAMINATION:  CT RENAL STONE STUDY ABD PELVIS WO    CLINICAL HISTORY:  Flank pain, kidney stone suspected;    TECHNIQUE:  Axial CT imaging of the abdomen and pelvis is performed without contrast.    CT dose reduction technique used - Dose Rite and tube current modulation.    COMPARISON:  None available    FINDINGS:  Cardiac and lung bases are within normal limits    CT abdomen: The gallbladder has calculus present.  Liver, spleen, pancreas and  adrenal glands are normal in size and density.  No evidence of focal lesion is demonstrated in these solid organs.    There is moderate left hydronephrosis and perinephric stranding.  There is a calculus at the left ureteropelvic junction that measures up to 14 mm.  Nonobstructing calculus present in the left kidney.    The bowel caliber is normal and no wall thickening or adjacent inflammatory change is seen.  No evidence of free fluid or free air is present.  Appendix is normal.    CT pelvis: The bowel and bladder appear within normal limits.  The pelvic organs show no evidence of abnormality                                       Medications   promethazine tablet 25 mg (has no administration in time range)   sodium chloride 0.9% flush 10 mL (has no administration in time range)   naloxone 0.4 mg/mL injection 0.02 mg (has no administration in time range)   glucose chewable tablet 16 g (has no administration in time range)   glucose chewable tablet 24 g (has no administration in time range)   glucagon (human recombinant) injection 1 mg (has no administration in time range)   lactated ringers infusion ( Intravenous New Bag 6/26/24 1639)   ondansetron disintegrating tablet 8 mg (has no administration in time range)   acetaminophen tablet 650 mg (has no administration in time range)   dextrose 10% bolus 125 mL 125 mL (has no administration in time range)   dextrose 10% bolus 250 mL 250 mL (has no administration in time range)   meropenem (MERREM) 1 g in 0.9% NaCl 100 mL IVPB (MB+) (0 g Intravenous Stopped 6/26/24 1425)   vancomycin (VANCOCIN) 1,750 mg in D5W 500 mL IVPB (1,750 mg Intravenous New Bag 6/26/24 1427)   vancomycin - pharmacy to dose (has no administration in time range)   tamsulosin 24 hr capsule 0.4 mg (0.4 mg Oral Given 6/26/24 1523)   potassium chloride SA CR tablet 10 mEq (10 mEq Oral Given 6/26/24 1523)   HYDROmorphone (PF) injection 1 mg (1 mg Intravenous Given 6/26/24 1425)   HYDROmorphone (PF)  injection 1 mg (1 mg Intravenous Given 6/26/24 0705)   promethazine (PHENERGAN) 12.5 mg in 0.9% NaCl 50 mL IVPB (0 mg Intravenous Stopped 6/26/24 0726)     Medical Decision Making  MDM    Patient presents for emergent evaluation of acute flank pain that poses a threat to life and/or bodily function.    In the ED patient found to have acute nephrolithiasis hydronephrosis.    I ordered labs and personally reviewed them.  Labs significant for white count 13.35.  No associated fever.  Urinalysis pending at the time of disposition..    I ordered CT scan and personally reviewed it and reviewed the radiologist interpretation.  CT significant for CT stone protocol shows 14 mm stone in the left ureterovesicular junction with mild left-sided hydronephrosis.      Admission MDM  I discussed the patient presentation and findings with the consultant for urology Hospital Medicine (speciality).    Patient was managed in the ED with IV Dilaudid Phenergan  The response to treatment was stable.    Patient required emergent consultation to Hospital Medicine (admitting physician) for admission.     hospitalize this patient to Hospital Medicine likely observation status.  Discussed the patient with Dr. Holliday and he was hoping for you guys to admit and he can perhaps do a procedure on the patient tomorrow.  Patient comes in with left-sided flank pain and CT shows a 14 mm ureterovesicular junction stone with hydronephrosis.  He does not have a significant white count or fever.  He was just now able to give us urine.  Patient does feel a little better after Dilaudid and Phenergan.  A couple of months ago we do not have urology coverage and had to transfer him [facility] to [facility] and patient says he had ureteroscopy and stent although that has not been verified.  At that time there was some issues with him being homeless and living in a car.  Now he is living with his son.  Urinalysis is pending.  Did order a urine culture.    Amount and/or  Complexity of Data Reviewed  Labs: ordered.  Radiology: ordered.    Risk  OTC drugs.  Prescription drug management.  Decision regarding hospitalization.                                      Clinical Impression:   Final diagnoses:  [N20.0] Nephrolithiasis  [R07.9] Chest pain        ED Disposition Condition    Admit                 Connor Srivastava MD  06/26/24 2955

## 2024-06-26 NOTE — ASSESSMENT & PLAN NOTE
Patient with subjective fever and chills elevated white blood cell count and pyuria on UA.  Nitrite is also positive.  We will treat as pyelonephritis.  Vanc and Merrem pending culture results

## 2024-06-26 NOTE — PROGRESS NOTES
"Pharmacokinetic Initial Assessment: IV Vancomycin    Assessment/Plan:    Initiate intravenous vancomycin as 1750 mg IV q18h  Desired empiric serum trough concentration is 10 to 15 mcg/mL  Draw vancomycin trough level 30 min prior to fourth dose on 6/28 at approximately 1930  Pharmacy will continue to follow and monitor vancomycin.      Please contact pharmacy at extension [phone] with any questions regarding this assessment.     Patient brief summary:  Mickey Quiles is a 58 y.o. male initiated on antimicrobial therapy with IV Vancomycin for treatment of suspected urinary tract infection    Drug Allergies:   Review of patient's allergies indicates:  No Known Allergies    Actual Body Weight:   81.6 kg    Renal Function:   Estimated Creatinine Clearance: 87.5 mL/min (based on SCr of 0.98 mg/dL).,     Dialysis Method (if applicable):  N/A    CBC (last 72 hours):  Recent Labs   Lab Result Units 06/26/24  0709   WBC K/uL 13.35*   Hemoglobin g/dL 15.9   Hematocrit % 50.0   Platelet Count K/uL 202   Lymphocytes % % 9.4*   Monocytes % % 7.8*   Eosinophils % % 0.1*   Basophils % % 0.2   Diff Type  Auto       Metabolic Panel (last 72 hours):  Recent Labs   Lab Result Units 06/26/24  0709 06/26/24  0942   Sodium mmol/L 136  --    Potassium mmol/L 3.3*  --    Chloride mmol/L 110*  --    CO2 mmol/L 20*  --    Glucose mg/dL 119*  --    Glucose, UA mg/dL  --  Normal   BUN mg/dL 14  --    Creatinine mg/dL 0.98  --    Albumin g/dL 3.7  --    Bilirubin, Total mg/dL 0.5  --    Alk Phos U/L 77  --    AST U/L 12*  --    ALT U/L 16  --        Drug levels (last 3 results):  No results for input(s): "VANCOMYCINRA", "VANCORANDOM", "VANCOMYCINPE", "VANCOPEAK", "VANCOMYCINTR", "VANCOTROUGH" in the last 72 hours.    Microbiologic Results:  Microbiology Results (last 7 days)       Procedure Component Value Units Date/Time    Urine culture [[id number]] Collected: 06/26/24 0942    Order Status: Sent Specimen: Urine, Clean Catch Updated: 06/26/24 " 1017    Urine culture [[id number]]     Order Status: Sent Specimen: Urine

## 2024-06-26 NOTE — SUBJECTIVE & OBJECTIVE
Past Medical History:   Diagnosis Date    Left ureteral stone     9 mm       Past Surgical History:   Procedure Laterality Date    NECK SURGERY         Review of patient's allergies indicates:  No Known Allergies    No current facility-administered medications on file prior to encounter.     Current Outpatient Medications on File Prior to Encounter   Medication Sig    promethazine (PHENERGAN) 25 MG tablet Take 1 tablet (25 mg total) by mouth every 6 (six) hours as needed for Nausea.    [DISCONTINUED] clindamycin (CLEOCIN) 300 MG capsule Take 1 capsule (300 mg total) by mouth every 6 (six) hours.    [DISCONTINUED] HYDROcodone-acetaminophen (NORCO) 5-325 mg per tablet Take 1 tablet by mouth every 6 (six) hours as needed for Pain.    [DISCONTINUED] tamsulosin (FLOMAX) 0.4 mg Cap Take 1 capsule (0.4 mg total) by mouth once daily.     Family History    None       Tobacco Use    Smoking status: Every Day     Current packs/day: 2.00     Average packs/day: 2.0 packs/day for 35.0 years (70.0 ttl pk-yrs)     Types: Cigarettes     Start date: 6/26/1989    Smokeless tobacco: Never   Substance and Sexual Activity    Alcohol use: Never    Drug use: Not Currently    Sexual activity: Not Currently     Partners: Female     Review of Systems   Constitutional:  Positive for chills and fever. Negative for activity change.   HENT:  Negative for sinus pressure and sinus pain.    Eyes:  Negative for pain and visual disturbance.   Respiratory:  Negative for cough.    Cardiovascular:  Negative for chest pain and palpitations.   Gastrointestinal:  Positive for nausea and vomiting. Negative for blood in stool.   Genitourinary:  Positive for flank pain. Negative for urgency.   Musculoskeletal:  Negative for gait problem, neck pain and neck stiffness.   Skin:  Negative for color change and pallor.   Neurological:  Negative for weakness and numbness.   Hematological:  Negative for adenopathy. Does not bruise/bleed easily.    Psychiatric/Behavioral:  Negative for confusion and decreased concentration.      Objective:     Vital Signs (Most Recent):  Temp: 97 °F (36.1 °C) (06/26/24 0627)  Pulse: 70 (06/26/24 0627)  Resp: 18 (06/26/24 0705)  BP: (!) 176/99 (06/26/24 0627)  SpO2: 95 % (06/26/24 0627) Vital Signs (24h Range):  Temp:  [97 °F (36.1 °C)] 97 °F (36.1 °C)  Pulse:  [70] 70  Resp:  [18-28] 18  SpO2:  [95 %] 95 %  BP: (176)/(99) 176/99     Weight: 81.6 kg (180 lb)  Body mass index is 25.1 kg/m².     Physical Exam  Constitutional:       General: He is not in acute distress.     Appearance: He is not toxic-appearing.   HENT:      Head: Normocephalic and atraumatic.      Right Ear: External ear normal.      Left Ear: External ear normal.   Eyes:      General: No scleral icterus.  Cardiovascular:      Rate and Rhythm: Normal rate and regular rhythm.      Heart sounds: Normal heart sounds.   Pulmonary:      Effort: Pulmonary effort is normal. No respiratory distress.      Breath sounds: Normal breath sounds.   Abdominal:      General: Abdomen is flat. There is no distension.      Palpations: Abdomen is soft.      Tenderness: There is no abdominal tenderness.   Musculoskeletal:      Right lower leg: No edema.      Left lower leg: No edema.   Skin:     General: Skin is warm and dry.   Neurological:      General: No focal deficit present.      Mental Status: He is alert and oriented to person, place, and time.   Psychiatric:         Mood and Affect: Mood normal.         Behavior: Behavior normal.                Significant Labs: All pertinent labs within the past 24 hours have been reviewed.  BMP:   Recent Labs   Lab 06/26/24  0709   *      K 3.3*   *   CO2 20*   BUN 14   CREATININE 0.98   CALCIUM 9.0     CBC:   Recent Labs   Lab 06/26/24  0709   WBC 13.35*   HGB 15.9   HCT 50.0          Significant Imaging: I have reviewed all pertinent imaging results/findings within the past 24 hours.

## 2024-06-26 NOTE — ASSESSMENT & PLAN NOTE
Patient has hypokalemia which is Acute, mild, and currently uncontrolled. Most recent potassium levels reviewed-   Lab Results   Component Value Date    K 3.3 (L) 06/26/2024   . Will continue potassium replacement per protocol and recheck repeat levels after replacement completed.     We will give lactated Ringer's and p.o. potassium
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
## 2024-06-26 NOTE — PHARMACY MED REC
"Admission Medication History     The home medication history was taken by Denise Rubin.    You may go to "Admission" then "Reconcile Home Medications" tabs to review and/or act upon these items.     The home medication list has been updated by the Pharmacy department.   Please read ALL comments highlighted in yellow.   Please address this information as you see fit.    Feel free to contact us if you have any questions or require assistance.      The medications listed below were removed from the home medication list. Please reorder if appropriate:  Clindamycin 300 mg  Norco 5-325 mg  Tamsulosin 0.4 mg    Medications listed below were obtained from: [employer] and Medical records  (Not in a hospital admission)        Current Outpatient Medications on File Prior to Encounter   Medication Sig Dispense Refill Last Dose    promethazine (PHENERGAN) 25 MG tablet Take 1 tablet (25 mg total) by mouth every 6 (six) hours as needed for Nausea. 15 tablet 0     [DISCONTINUED] clindamycin (CLEOCIN) 300 MG capsule Take 1 capsule (300 mg total) by mouth every 6 (six) hours. 28 capsule 0     [DISCONTINUED] HYDROcodone-acetaminophen (NORCO) 5-325 mg per tablet Take 1 tablet by mouth every 6 (six) hours as needed for Pain. 12 tablet 0     [DISCONTINUED] tamsulosin (FLOMAX) 0.4 mg Cap Take 1 capsule (0.4 mg total) by mouth once daily. 10 capsule 0          Potential issues to be addressed PRIOR TO DISCHARGE  No issues identified.    Denise Rubin  Pharmacy Tech Specialist - Medication History  EXT. [phone]      .          "

## 2024-06-26 NOTE — HPI
Patient is a 58-year-old white male who awakened from sleep about 1:00 a.m. with left-sided abdominal pain.  Patient had subjective fever and chills as well.  Patient has history of kidney stones.  Patient salt care in [facility] ED and was found to have a 14 mm stone at the left uretovesicular junction.  Evaluation revealed 182 white blood cells in his urine nitrite positive.  On CBC white count was 13.  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

## 2024-06-26 NOTE — H&P
[facility] - Emergency Department  Hospital Medicine  History & Physical    Patient Name: Mickey Quiles  MRN: [id number]  Patient Class: OP- Observation  Admission Date: 6/26/2024  Attending Physician: Rancho Tuttle Jr., MD   Primary Care Provider: Patricia Primary Doctor         Patient information was obtained from ER records.     Subjective:     Principal Problem:Nephrolithiasis    Chief Complaint:   Chief Complaint   Patient presents with    Abdominal Pain    Flank Pain     L flank pain        HPI: Patient is a 58-year-old white male who awakened from sleep about 1:00 a.m. with left-sided abdominal pain.  Patient had subjective fever and chills as well.  Patient has history of kidney stones.  Patient salt care in [facility] ED and was found to have a 14 mm stone at the left uretovesicular junction.  Evaluation revealed 182 white blood cells in his urine nitrite positive.  On CBC white count was 13.  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    Past Medical History:   Diagnosis Date    Left ureteral stone     9 mm       Past Surgical History:   Procedure Laterality Date    NECK SURGERY         Review of patient's allergies indicates:  No Known Allergies    No current facility-administered medications on file prior to encounter.     Current Outpatient Medications on File Prior to Encounter   Medication Sig    promethazine (PHENERGAN) 25 MG tablet Take 1 tablet (25 mg total) by mouth every 6 (six) hours as needed for Nausea.    [DISCONTINUED] clindamycin (CLEOCIN) 300 MG capsule Take 1 capsule (300 mg total) by mouth every 6 (six) hours.    [DISCONTINUED] HYDROcodone-acetaminophen (NORCO) 5-325 mg per tablet Take 1 tablet by mouth every 6 (six) hours as needed for Pain.    [DISCONTINUED] tamsulosin (FLOMAX) 0.4 mg Cap Take 1 capsule (0.4 mg total) by mouth once daily.     Family History    None       Tobacco Use    Smoking status: Every Day     Current packs/day: 2.00     Average  packs/day: 2.0 packs/day for 35.0 years (70.0 ttl pk-yrs)     Types: Cigarettes     Start date: 6/26/1989    Smokeless tobacco: Never   Substance and Sexual Activity    Alcohol use: Never    Drug use: Not Currently    Sexual activity: Not Currently     Partners: Female     Review of Systems   Constitutional:  Positive for chills and fever. Negative for activity change.   HENT:  Negative for sinus pressure and sinus pain.    Eyes:  Negative for pain and visual disturbance.   Respiratory:  Negative for cough.    Cardiovascular:  Negative for chest pain and palpitations.   Gastrointestinal:  Positive for nausea and vomiting. Negative for blood in stool.   Genitourinary:  Positive for flank pain. Negative for urgency.   Musculoskeletal:  Negative for gait problem, neck pain and neck stiffness.   Skin:  Negative for color change and pallor.   Neurological:  Negative for weakness and numbness.   Hematological:  Negative for adenopathy. Does not bruise/bleed easily.   Psychiatric/Behavioral:  Negative for confusion and decreased concentration.      Objective:     Vital Signs (Most Recent):  Temp: 97 °F (36.1 °C) (06/26/24 0627)  Pulse: 70 (06/26/24 0627)  Resp: 18 (06/26/24 0705)  BP: (!) 176/99 (06/26/24 0627)  SpO2: 95 % (06/26/24 0627) Vital Signs (24h Range):  Temp:  [97 °F (36.1 °C)] 97 °F (36.1 °C)  Pulse:  [70] 70  Resp:  [18-28] 18  SpO2:  [95 %] 95 %  BP: (176)/(99) 176/99     Weight: 81.6 kg (180 lb)  Body mass index is 25.1 kg/m².     Physical Exam  Constitutional:       General: He is not in acute distress.     Appearance: He is not toxic-appearing.   HENT:      Head: Normocephalic and atraumatic.      Right Ear: External ear normal.      Left Ear: External ear normal.   Eyes:      General: No scleral icterus.  Cardiovascular:      Rate and Rhythm: Normal rate and regular rhythm.      Heart sounds: Normal heart sounds.   Pulmonary:      Effort: Pulmonary effort is normal. No respiratory distress.      Breath  sounds: Normal breath sounds.   Abdominal:      General: Abdomen is flat. There is no distension.      Palpations: Abdomen is soft.      Tenderness: There is no abdominal tenderness.   Musculoskeletal:      Right lower leg: No edema.      Left lower leg: No edema.   Skin:     General: Skin is warm and dry.   Neurological:      General: No focal deficit present.      Mental Status: He is alert and oriented to person, place, and time.   Psychiatric:         Mood and Affect: Mood normal.         Behavior: Behavior normal.                Significant Labs: All pertinent labs within the past 24 hours have been reviewed.  BMP:   Recent Labs   Lab 06/26/24  0709   *      K 3.3*   *   CO2 20*   BUN 14   CREATININE 0.98   CALCIUM 9.0     CBC:   Recent Labs   Lab 06/26/24  0709   WBC 13.35*   HGB 15.9   HCT 50.0          Significant Imaging: I have reviewed all pertinent imaging results/findings within the past 24 hours.  Assessment/Plan:     * Nephrolithiasis    Patient with large stone close to the bladder.  Urology is aware.  Probable intervention tomorrow.  Pain control, start Flomax    Hypokalemia  Patient has hypokalemia which is Acute, mild, and currently uncontrolled. Most recent potassium levels reviewed-   Lab Results   Component Value Date    K 3.3 (L) 06/26/2024   . Will continue potassium replacement per protocol and recheck repeat levels after replacement completed.     We will give lactated Ringer's and p.o. potassium    Acute pyelonephritis    Patient with subjective fever and chills elevated white blood cell count and pyuria on UA.  Nitrite is also positive.  We will treat as pyelonephritis.  Vanc and Merrem pending culture results      VTE Risk Mitigation (From admission, onward)           Ordered     heparin (porcine) injection 5,000 Units  Every 8 hours         06/26/24 1137     IP VTE HIGH RISK PATIENT  Once         06/26/24 1137     Place sequential compression device  Until  "discontinued         06/26/24 1137                       On 06/26/2024, patient should be placed in hospital observation services under my care.        Pharmacokinetic Initial Assessment: IV Vancomycin    Assessment/Plan:    Initiate intravenous vancomycin as 1750 mg IV q18h  Desired empiric serum trough concentration is 10 to 15 mcg/mL  Draw vancomycin trough level 30 min prior to fourth dose on 6/28 at approximately 1930  Pharmacy will continue to follow and monitor vancomycin.      Please contact pharmacy at extension [phone] with any questions regarding this assessment.     Patient brief summary:  Mickey Quiles is a 58 y.o. male initiated on antimicrobial therapy with IV Vancomycin for treatment of suspected urinary tract infection    Drug Allergies:   Review of patient's allergies indicates:  No Known Allergies    Actual Body Weight:   81.6 kg    Renal Function:   Estimated Creatinine Clearance: 87.5 mL/min (based on SCr of 0.98 mg/dL).,     Dialysis Method (if applicable):  N/A    CBC (last 72 hours):  Recent Labs   Lab Result Units 06/26/24  0709   WBC K/uL 13.35*   Hemoglobin g/dL 15.9   Hematocrit % 50.0   Platelet Count K/uL 202   Lymphocytes % % 9.4*   Monocytes % % 7.8*   Eosinophils % % 0.1*   Basophils % % 0.2   Diff Type  Auto       Metabolic Panel (last 72 hours):  Recent Labs   Lab Result Units 06/26/24  0709 06/26/24  0942   Sodium mmol/L 136  --    Potassium mmol/L 3.3*  --    Chloride mmol/L 110*  --    CO2 mmol/L 20*  --    Glucose mg/dL 119*  --    Glucose, UA mg/dL  --  Normal   BUN mg/dL 14  --    Creatinine mg/dL 0.98  --    Albumin g/dL 3.7  --    Bilirubin, Total mg/dL 0.5  --    Alk Phos U/L 77  --    AST U/L 12*  --    ALT U/L 16  --        Drug levels (last 3 results):  No results for input(s): "VANCOMYCINRA", "VANCORANDOM", "VANCOMYCINPE", "VANCOPEAK", "VANCOMYCINTR", "VANCOTROUGH" in the last 72 hours.    Microbiologic Results:  Microbiology Results (last 7 days)       Procedure " Component Value Units Date/Time    Urine culture [[id number]] Collected: 06/26/24 0942    Order Status: Sent Specimen: Urine, Clean Catch Updated: 06/26/24 1017    Urine culture [[id number]]     Order Status: Sent Specimen: Urine               Rancho Tuttle Jr, MD  Department of Hospital Medicine  [facility] - Emergency Department

## 2024-06-26 NOTE — ASSESSMENT & PLAN NOTE
Patient with large stone close to the bladder.  Urology is aware.  Probable intervention tomorrow.  Pain control, start Flomax

## 2024-06-27 ENCOUNTER — ANESTHESIA EVENT (OUTPATIENT)
Dept: SURGERY | Facility: HOSPITAL | Age: 59
End: 2024-06-27

## 2024-06-27 ENCOUNTER — ANESTHESIA (OUTPATIENT)
Dept: SURGERY | Facility: HOSPITAL | Age: 59
End: 2024-06-27

## 2024-06-27 LAB
ANION GAP SERPL CALCULATED.3IONS-SCNC: 11 MMOL/L (ref 7–16)
BASOPHILS # BLD AUTO: 0.03 K/UL (ref 0–0.2)
BASOPHILS NFR BLD AUTO: 0.3 % (ref 0–1)
BUN SERPL-MCNC: 12 MG/DL (ref 7–18)
BUN/CREAT SERPL: 16 (ref 6–20)
CALCIUM SERPL-MCNC: 8.4 MG/DL (ref 8.5–10.1)
CHLORIDE SERPL-SCNC: 106 MMOL/L (ref 98–107)
CO2 SERPL-SCNC: 23 MMOL/L (ref 21–32)
CREAT SERPL-MCNC: 0.74 MG/DL (ref 0.7–1.3)
DIFFERENTIAL METHOD BLD: ABNORMAL
EGFR (NO RACE VARIABLE) (RUSH/TITUS): 105 ML/MIN/1.73M2
EOSINOPHIL # BLD AUTO: 0.03 K/UL (ref 0–0.5)
EOSINOPHIL NFR BLD AUTO: 0.3 % (ref 1–4)
ERYTHROCYTE [DISTWIDTH] IN BLOOD BY AUTOMATED COUNT: 13.7 % (ref 11.5–14.5)
GLUCOSE SERPL-MCNC: 88 MG/DL (ref 74–106)
HCT VFR BLD AUTO: 43 % (ref 40–54)
HGB BLD-MCNC: 13.9 G/DL (ref 13.5–18)
IMM GRANULOCYTES # BLD AUTO: 0.05 K/UL (ref 0–0.04)
IMM GRANULOCYTES NFR BLD: 0.5 % (ref 0–0.4)
LYMPHOCYTES # BLD AUTO: 1.98 K/UL (ref 1–4.8)
LYMPHOCYTES NFR BLD AUTO: 18.3 % (ref 27–41)
MCH RBC QN AUTO: 28.7 PG (ref 27–31)
MCHC RBC AUTO-ENTMCNC: 32.3 G/DL (ref 32–36)
MCV RBC AUTO: 88.8 FL (ref 80–96)
MONOCYTES # BLD AUTO: 1.04 K/UL (ref 0–0.8)
MONOCYTES NFR BLD AUTO: 9.6 % (ref 2–6)
MPC BLD CALC-MCNC: 10.7 FL (ref 9.4–12.4)
NEUTROPHILS # BLD AUTO: 7.69 K/UL (ref 1.8–7.7)
NEUTROPHILS NFR BLD AUTO: 71 % (ref 53–65)
NRBC # BLD AUTO: 0 X10E3/UL
NRBC, AUTO (.00): 0 %
PLATELET # BLD AUTO: 147 K/UL (ref 150–400)
POTASSIUM SERPL-SCNC: 3.4 MMOL/L (ref 3.5–5.1)
RBC # BLD AUTO: 4.84 M/UL (ref 4.6–6.2)
SODIUM SERPL-SCNC: 137 MMOL/L (ref 136–145)
WBC # BLD AUTO: 10.82 K/UL (ref 4.5–11)

## 2024-06-27 PROCEDURE — 36000706: Performed by: UROLOGY

## 2024-06-27 PROCEDURE — 99233 SBSQ HOSP IP/OBS HIGH 50: CPT | Mod: ,,, | Performed by: HOSPITALIST

## 2024-06-27 PROCEDURE — 63600175 PHARM REV CODE 636 W HCPCS: Performed by: ANESTHESIOLOGY

## 2024-06-27 PROCEDURE — 27000165 HC TUBE, ETT CUFFED: Performed by: ANESTHESIOLOGY

## 2024-06-27 PROCEDURE — 11000001 HC ACUTE MED/SURG PRIVATE ROOM

## 2024-06-27 PROCEDURE — 27000655: Performed by: ANESTHESIOLOGY

## 2024-06-27 PROCEDURE — 87641 MR-STAPH DNA AMP PROBE: CPT | Performed by: HOSPITALIST

## 2024-06-27 PROCEDURE — C1758 CATHETER, URETERAL: HCPCS | Performed by: UROLOGY

## 2024-06-27 PROCEDURE — 27000689 HC BLADE LARYNGOSCOPE ANY SIZE: Performed by: ANESTHESIOLOGY

## 2024-06-27 PROCEDURE — 36000707: Performed by: UROLOGY

## 2024-06-27 PROCEDURE — 37000009 HC ANESTHESIA EA ADD 15 MINS: Performed by: UROLOGY

## 2024-06-27 PROCEDURE — 80048 BASIC METABOLIC PNL TOTAL CA: CPT | Performed by: FAMILY MEDICINE

## 2024-06-27 PROCEDURE — C1894 INTRO/SHEATH, NON-LASER: HCPCS | Performed by: UROLOGY

## 2024-06-27 PROCEDURE — 25000003 PHARM REV CODE 250: Performed by: NURSE ANESTHETIST, CERTIFIED REGISTERED

## 2024-06-27 PROCEDURE — 25000003 PHARM REV CODE 250: Performed by: FAMILY MEDICINE

## 2024-06-27 PROCEDURE — 25000003 PHARM REV CODE 250: Performed by: HOSPITALIST

## 2024-06-27 PROCEDURE — 71000033 HC RECOVERY, INTIAL HOUR: Performed by: UROLOGY

## 2024-06-27 PROCEDURE — 0T778DZ DILATION OF LEFT URETER WITH INTRALUMINAL DEVICE, VIA NATURAL OR ARTIFICIAL OPENING ENDOSCOPIC: ICD-10-PCS | Performed by: UROLOGY

## 2024-06-27 PROCEDURE — 27000716 HC OXISENSOR PROBE, ANY SIZE: Performed by: ANESTHESIOLOGY

## 2024-06-27 PROCEDURE — 0TC48ZZ EXTIRPATION OF MATTER FROM LEFT KIDNEY PELVIS, VIA NATURAL OR ARTIFICIAL OPENING ENDOSCOPIC: ICD-10-PCS | Performed by: UROLOGY

## 2024-06-27 PROCEDURE — 36415 COLL VENOUS BLD VENIPUNCTURE: CPT | Performed by: FAMILY MEDICINE

## 2024-06-27 PROCEDURE — 27201423 OPTIME MED/SURG SUP & DEVICES STERILE SUPPLY: Performed by: UROLOGY

## 2024-06-27 PROCEDURE — 63600175 PHARM REV CODE 636 W HCPCS: Performed by: NURSE ANESTHETIST, CERTIFIED REGISTERED

## 2024-06-27 PROCEDURE — C1769 GUIDE WIRE: HCPCS | Performed by: UROLOGY

## 2024-06-27 PROCEDURE — 85025 COMPLETE CBC W/AUTO DIFF WBC: CPT | Performed by: FAMILY MEDICINE

## 2024-06-27 PROCEDURE — 87086 URINE CULTURE/COLONY COUNT: CPT | Performed by: FAMILY MEDICINE

## 2024-06-27 PROCEDURE — 37000008 HC ANESTHESIA 1ST 15 MINUTES: Performed by: UROLOGY

## 2024-06-27 PROCEDURE — C2617 STENT, NON-COR, TEM W/O DEL: HCPCS | Performed by: UROLOGY

## 2024-06-27 PROCEDURE — 63600175 PHARM REV CODE 636 W HCPCS: Performed by: FAMILY MEDICINE

## 2024-06-27 PROCEDURE — 27000510 HC BLANKET BAIR HUGGER ANY SIZE: Performed by: ANESTHESIOLOGY

## 2024-06-27 DEVICE — STENT UNIVERSA SFT 8F 22-32CM: Type: IMPLANTABLE DEVICE | Site: URETER | Status: FUNCTIONAL

## 2024-06-27 RX ORDER — ROCURONIUM BROMIDE 10 MG/ML
INJECTION, SOLUTION INTRAVENOUS
Status: DISCONTINUED | OUTPATIENT
Start: 2024-06-27 | End: 2024-06-27

## 2024-06-27 RX ORDER — ONDANSETRON HYDROCHLORIDE 2 MG/ML
INJECTION, SOLUTION INTRAVENOUS
Status: DISCONTINUED | OUTPATIENT
Start: 2024-06-27 | End: 2024-06-27

## 2024-06-27 RX ORDER — ONDANSETRON HYDROCHLORIDE 2 MG/ML
4 INJECTION, SOLUTION INTRAVENOUS DAILY PRN
Status: DISCONTINUED | OUTPATIENT
Start: 2024-06-27 | End: 2024-06-27 | Stop reason: HOSPADM

## 2024-06-27 RX ORDER — FAMOTIDINE 20 MG/1
20 TABLET, FILM COATED ORAL 2 TIMES DAILY
Status: DISCONTINUED | OUTPATIENT
Start: 2024-06-27 | End: 2024-06-28 | Stop reason: HOSPADM

## 2024-06-27 RX ORDER — GLYCOPYRROLATE 0.2 MG/ML
INJECTION INTRAMUSCULAR; INTRAVENOUS
Status: DISCONTINUED | OUTPATIENT
Start: 2024-06-27 | End: 2024-06-27

## 2024-06-27 RX ORDER — PROPOFOL 10 MG/ML
VIAL (ML) INTRAVENOUS
Status: DISCONTINUED | OUTPATIENT
Start: 2024-06-27 | End: 2024-06-27

## 2024-06-27 RX ORDER — LIDOCAINE HYDROCHLORIDE 20 MG/ML
INJECTION, SOLUTION EPIDURAL; INFILTRATION; INTRACAUDAL; PERINEURAL
Status: DISCONTINUED | OUTPATIENT
Start: 2024-06-27 | End: 2024-06-27

## 2024-06-27 RX ORDER — OXYCODONE HYDROCHLORIDE 5 MG/1
5 TABLET ORAL
Status: DISCONTINUED | OUTPATIENT
Start: 2024-06-27 | End: 2024-06-27 | Stop reason: HOSPADM

## 2024-06-27 RX ORDER — DIPHENHYDRAMINE HYDROCHLORIDE 50 MG/ML
25 INJECTION INTRAMUSCULAR; INTRAVENOUS EVERY 6 HOURS PRN
Status: DISCONTINUED | OUTPATIENT
Start: 2024-06-27 | End: 2024-06-27 | Stop reason: HOSPADM

## 2024-06-27 RX ORDER — HYDROMORPHONE HYDROCHLORIDE 2 MG/ML
0.5 INJECTION, SOLUTION INTRAMUSCULAR; INTRAVENOUS; SUBCUTANEOUS EVERY 5 MIN PRN
Status: DISCONTINUED | OUTPATIENT
Start: 2024-06-27 | End: 2024-06-27 | Stop reason: HOSPADM

## 2024-06-27 RX ORDER — HYDROCODONE BITARTRATE AND ACETAMINOPHEN 10; 325 MG/1; MG/1
1 TABLET ORAL EVERY 6 HOURS PRN
Status: DISCONTINUED | OUTPATIENT
Start: 2024-06-27 | End: 2024-06-28 | Stop reason: HOSPADM

## 2024-06-27 RX ORDER — FENTANYL CITRATE 50 UG/ML
INJECTION, SOLUTION INTRAMUSCULAR; INTRAVENOUS
Status: DISCONTINUED | OUTPATIENT
Start: 2024-06-27 | End: 2024-06-27

## 2024-06-27 RX ORDER — SODIUM CHLORIDE, SODIUM LACTATE, POTASSIUM CHLORIDE, CALCIUM CHLORIDE 600; 310; 30; 20 MG/100ML; MG/100ML; MG/100ML; MG/100ML
125 INJECTION, SOLUTION INTRAVENOUS CONTINUOUS
Status: DISCONTINUED | OUTPATIENT
Start: 2024-06-27 | End: 2024-06-28 | Stop reason: HOSPADM

## 2024-06-27 RX ORDER — SODIUM CHLORIDE 0.9 % (FLUSH) 0.9 %
10 SYRINGE (ML) INJECTION
Status: DISCONTINUED | OUTPATIENT
Start: 2024-06-27 | End: 2024-06-28 | Stop reason: HOSPADM

## 2024-06-27 RX ORDER — EPHEDRINE SULFATE 50 MG/ML
INJECTION, SOLUTION INTRAVENOUS
Status: DISCONTINUED | OUTPATIENT
Start: 2024-06-27 | End: 2024-06-27

## 2024-06-27 RX ORDER — MORPHINE SULFATE 10 MG/ML
4 INJECTION INTRAMUSCULAR; INTRAVENOUS; SUBCUTANEOUS EVERY 5 MIN PRN
Status: DISCONTINUED | OUTPATIENT
Start: 2024-06-27 | End: 2024-06-27 | Stop reason: HOSPADM

## 2024-06-27 RX ORDER — MEPERIDINE HYDROCHLORIDE 25 MG/ML
25 INJECTION INTRAMUSCULAR; INTRAVENOUS; SUBCUTANEOUS EVERY 10 MIN PRN
Status: DISCONTINUED | OUTPATIENT
Start: 2024-06-27 | End: 2024-06-27 | Stop reason: HOSPADM

## 2024-06-27 RX ORDER — MIDAZOLAM HYDROCHLORIDE 1 MG/ML
INJECTION INTRAMUSCULAR; INTRAVENOUS
Status: DISCONTINUED | OUTPATIENT
Start: 2024-06-27 | End: 2024-06-27

## 2024-06-27 RX ADMIN — SODIUM CHLORIDE, POTASSIUM CHLORIDE, SODIUM LACTATE AND CALCIUM CHLORIDE: 600; 310; 30; 20 INJECTION, SOLUTION INTRAVENOUS at 11:06

## 2024-06-27 RX ADMIN — VANCOMYCIN HYDROCHLORIDE 1750 MG: 500 INJECTION, POWDER, LYOPHILIZED, FOR SOLUTION INTRAVENOUS at 08:06

## 2024-06-27 RX ADMIN — POTASSIUM CHLORIDE 10 MEQ: 750 TABLET, EXTENDED RELEASE ORAL at 08:06

## 2024-06-27 RX ADMIN — HYDROMORPHONE HYDROCHLORIDE 1 MG: 2 INJECTION INTRAMUSCULAR; INTRAVENOUS; SUBCUTANEOUS at 08:06

## 2024-06-27 RX ADMIN — MIDAZOLAM HYDROCHLORIDE 2 MG: 1 INJECTION, SOLUTION INTRAMUSCULAR; INTRAVENOUS at 11:06

## 2024-06-27 RX ADMIN — EPHEDRINE SULFATE 50 MG: 50 INJECTION INTRAVENOUS at 11:06

## 2024-06-27 RX ADMIN — SODIUM CHLORIDE, POTASSIUM CHLORIDE, SODIUM LACTATE AND CALCIUM CHLORIDE: 600; 310; 30; 20 INJECTION, SOLUTION INTRAVENOUS at 01:06

## 2024-06-27 RX ADMIN — PROPOFOL 120 MG: 10 INJECTION, EMULSION INTRAVENOUS at 11:06

## 2024-06-27 RX ADMIN — HYDROMORPHONE HYDROCHLORIDE 1 MG: 2 INJECTION INTRAMUSCULAR; INTRAVENOUS; SUBCUTANEOUS at 04:06

## 2024-06-27 RX ADMIN — GLYCOPYRROLATE 0.2 MG: 0.2 INJECTION INTRAMUSCULAR; INTRAVENOUS at 12:06

## 2024-06-27 RX ADMIN — HYDROMORPHONE HYDROCHLORIDE 1 MG: 2 INJECTION INTRAMUSCULAR; INTRAVENOUS; SUBCUTANEOUS at 02:06

## 2024-06-27 RX ADMIN — FENTANYL CITRATE 100 MCG: 50 INJECTION INTRAMUSCULAR; INTRAVENOUS at 11:06

## 2024-06-27 RX ADMIN — FAMOTIDINE 20 MG: 20 TABLET, FILM COATED ORAL at 09:06

## 2024-06-27 RX ADMIN — MEROPENEM 1 G: 1 INJECTION, POWDER, FOR SOLUTION INTRAVENOUS at 11:06

## 2024-06-27 RX ADMIN — SODIUM CHLORIDE, POTASSIUM CHLORIDE, SODIUM LACTATE AND CALCIUM CHLORIDE 125 ML/HR: 600; 310; 30; 20 INJECTION, SOLUTION INTRAVENOUS at 09:06

## 2024-06-27 RX ADMIN — MEROPENEM 1 G: 1 INJECTION, POWDER, FOR SOLUTION INTRAVENOUS at 09:06

## 2024-06-27 RX ADMIN — HYDROMORPHONE HYDROCHLORIDE 1 MG: 2 INJECTION INTRAMUSCULAR; INTRAVENOUS; SUBCUTANEOUS at 07:06

## 2024-06-27 RX ADMIN — LIDOCAINE HYDROCHLORIDE 100 MG: 20 INJECTION, SOLUTION INTRAVENOUS at 11:06

## 2024-06-27 RX ADMIN — ONDANSETRON 4 MG: 2 INJECTION INTRAMUSCULAR; INTRAVENOUS at 11:06

## 2024-06-27 RX ADMIN — ROCURONIUM BROMIDE 35 MG: 10 INJECTION, SOLUTION INTRAVENOUS at 11:06

## 2024-06-27 RX ADMIN — MEROPENEM 1 G: 1 INJECTION, POWDER, FOR SOLUTION INTRAVENOUS at 05:06

## 2024-06-27 RX ADMIN — TAMSULOSIN HYDROCHLORIDE 0.4 MG: 0.4 CAPSULE ORAL at 08:06

## 2024-06-27 NOTE — PLAN OF CARE
"[facility] - [address] [place] [address]  Initial Discharge Assessment       Primary Care Provider: No, Primary Doctor    Admission Diagnosis: Nephrolithiasis [N20.0]  Chest pain [R07.9]    Admission Date: 6/26/2024  Expected Discharge Date:     Transition of Care Barriers: Unisured, Social, Homeless    Payor: /     Extended Emergency Contact Information  Primary Emergency Contact: Dave Quiles  Mobile Phone: [phone]  Relation: Son    Discharge Plan A: Other (Pt advises he is homeless and stays in either his truck or in motels; his son (Dave Quiles) is homeless, as well. Pt refuses assistance to access to local homeless shelter. Pt states "I'm moving to [place].")  Discharge Plan B: Other (Pt advises he is homeless and stays in either his truck or in motels; his son (Dave Quiles) is homeless, as well. Pt refuses assistance to access to local homeless shelter. Pt states "I'm moving to [place].")      [facility] [address] [facility] [place] [address] [facility] [address]  [address] [facility] [address] [facility] [address]  Phone: [phone] Fax: [phone]    [facility] [address] [facility], [place] [address] [place] [address]  Phone: [phone] Fax: [phone]      Initial Assessment (most recent)       Adult Discharge Assessment - 06/27/24 1056          Discharge Assessment    Assessment Type Discharge Planning Assessment     Source of Information patient     People in Home alone   Pt advises he is homeless and stays in either his truck or in motels; his son (Dave Quiles) is homeless, as well    Do you expect to return to your current living situation? Yes     Do you have help at home or someone to help you manage your care at home? No     Current cognitive status: Alert/Oriented     Walking or Climbing Stairs Difficulty no     Dressing/Bathing Difficulty no     Home Accessibility other (see comments)   Pt advises he is homeless and stays in either his truck or in motels; his " "son (Dave Quiles) is homeless, as well    Equipment Currently Used at Home none     Readmission within 30 days? No     Patient currently being followed by outpatient case management? No     Do you currently have service(s) that help you manage your care at home? No     Do you take prescription medications? No     Do you have prescription coverage? No     Do you have any problems affording any of your prescribed medications? TBD     Who is going to help you get home at discharge? self     How do you get to doctors appointments? car, drives self     Are you on dialysis? No     Do you take coumadin? No     Discharge Plan A Other   Pt advises he is homeless and stays in either his truck or in motels; his son (Dave Quiles) is homeless, as well. Pt refuses assistance to access to local homeless shelter. Pt states "I'm moving to [place]."    Discharge Plan B Other   Pt advises he is homeless and stays in either his truck or in motels; his son (Dave Quiles) is homeless, as well. Pt refuses assistance to access to local homeless shelter. Pt states "I'm moving to [place]."    Discharge Plan discussed with: Patient     Transition of Care Barriers Unisured;Social;Homeless        Physical Activity    On average, how many days per week do you engage in moderate to strenuous exercise (like a brisk walk)? 0 days     On average, how many minutes do you engage in exercise at this level? 0 min        Financial Resource Strain    How hard is it for you to pay for the very basics like food, housing, medical care, and heating? Hard        Housing Stability    In the last 12 months, was there a time when you were not able to pay the mortgage or rent on time? Yes     At any time in the past 12 months, were you homeless or living in a shelter (including now)? Yes        Transportation Needs    Has the lack of transportation kept you from medical appointments, meetings, work or from getting things needed for daily living? No     " "   Food Insecurity    Within the past 12 months, you worried that your food would run out before you got the money to buy more. Sometimes true     Within the past 12 months, the food you bought just didn't last and you didn't have money to get more. Never true        Social Isolation    How often do you feel lonely or isolated from those around you?  Sometimes        Alcohol Use    Q1: How often do you have a drink containing alcohol? Never     Q2: How many drinks containing alcohol do you have on a typical day when you are drinking? Patient does not drink     Q3: How often do you have six or more drinks on one occasion? Never        Utilities    In the past 12 months has the electric, gas, oil, or water company threatened to shut off services in your home? Already shut off        Health Literacy    How often do you need to have someone help you when you read instructions, pamphlets, or other written material from your doctor or pharmacy? Sometimes                      CM spoke to pt @ bedside. Pt is not current with HH and does not use DME. SDOH completed 06/27/24. Pt advises he is homeless and stays in either his truck or in motels; his son (Dave Quiles) is homeless, as well. Pt refuses assistance to access to local homeless shelter. Pt states "You call that a homeless shelter? That's not a homeless shelter. It's a drug den. I'm moving to [place]; that's where they have good homeless shelters. There's nothing here for anybody in [place] or [place]." Pt advises that he has had disability benefits multiple times through the years; however the last time he received these benefits was appx 6 years ago. When asked if he was aware of the reason the benefits were revoked, he stated, "No, and I didn't care enough to worry about askin'. If they didn't want me to have them, that's fine! I wasn't ugo' down there to find out." Pt lost his wife 2 years ago. CM will continue to follow.           "

## 2024-06-27 NOTE — ANESTHESIA PREPROCEDURE EVALUATION
06/27/2024  Mickey Quiles is a 58 y.o., male.      Pre-op Assessment    I have reviewed the Patient Summary Reports.     I have reviewed the Nursing Notes. I have reviewed the NPO Status.   I have reviewed the Medications.     Review of Systems  Anesthesia Hx:  No problems with previous Anesthesia                Social:  Non-Smoker, No Alcohol Use       Hematology/Oncology:  Hematology Normal   Oncology Normal                                   EENT/Dental:  EENT/Dental Normal           Cardiovascular:  Cardiovascular Normal                                            Pulmonary:  Pulmonary Normal                       Renal/:  Chronic Renal Disease renal calculi  Acute pyelonephritis             Hepatic/GI:  Hepatic/GI Normal                 Musculoskeletal:  Musculoskeletal Normal                Neurological:  Neurology Normal                                      Endocrine:  Endocrine Normal            Dermatological:  Skin Normal    Psych:  Psychiatric Normal                    Physical Exam  General: Well nourished    Airway:  Mallampati: II / II  Mouth Opening: Normal  TM Distance: > 6 cm  Tongue: Normal  Neck ROM: Normal ROM    Chest/Lungs:  Clear to auscultation, Normal Respiratory Rate    Heart:  Rate: Normal  Rhythm: Regular Rhythm        Anesthesia Plan  Type of Anesthesia, risks & benefits discussed:    Anesthesia Type: Gen Supraglottic Airway  Intra-op Monitoring Plan: Standard ASA Monitors  Post Op Pain Control Plan: multimodal analgesia  Induction:  IV  Informed Consent: Informed consent signed with the Patient and all parties understand the risks and agree with anesthesia plan.  All questions answered.   ASA Score: 2  Day of Surgery Review of History & Physical: H&P Update referred to the surgeon/provider.I have interviewed and examined the patient. I have reviewed the patient's H&P dated:      Ready For Surgery From Anesthesia Perspective.     .

## 2024-06-27 NOTE — TRANSFER OF CARE
"Anesthesia Transfer of Care Note    Patient: Mickey Quiles    Procedure(s) Performed: Procedure(s) (LRB):  CYSTOURETEROSCOPY WITH HOLMIUM LASER LITHOTRIPSY OF URETERAL CALCULUS AND STENT INSERTION (Left)    Patient location: PACU    Anesthesia Type: general    Transport from OR: Transported from OR on room air with adequate spontaneous ventilation    Post pain: adequate analgesia    Post assessment: no apparent anesthetic complications    Post vital signs: stable    Level of consciousness: lethargic    Nausea/Vomiting: no nausea/vomiting    Complications: none    Transfer of care protocol was followed      Last vitals: Visit Vitals  BP (!) 121/43   Pulse (!) 58   Temp 36.6 °C (97.9 °F)   Resp 12   Ht 5' 11" (1.803 m)   Wt 81.7 kg (180 lb 1.9 oz)   SpO2 96%   BMI 25.12 kg/m²     "

## 2024-06-27 NOTE — PROGRESS NOTES
1333 RECEIVED TO RR EASILY AROUSED FOLLOWS COMMANDS, ROSANNA. ORIENTATION GIVEN. NO C/O PAIN. NO RESP. DISTRESS NOTED. O2 SAT WNL ON ROOM AIR. STRING INTACT TO PENIS, PATIENT MADE AWARE NOT TO PULL OUT. IV INFUSING LEFT AC 20G. CATH. NO DISTRESS NOTED. SEE FLOW SHEET.    1405 AWAKE, ALERT. NO C/O PAIN. STENT STRING INTACT. NO S/S OF DISTRESS NOTED. TRANSFERRED TO ROOM.

## 2024-06-27 NOTE — PROGRESS NOTES
Patient seen again this morning and is comfortable at present.  He has had some pain off and on since admission.  He did have 1 chill  last night.  Currently afebrile.  He is receiving antibiotics.  We plan to proceed today with left ureteroscopy and ureterorenoscopy with fragmentation of as much of the stones in the left collecting system as we can get out.  Stent will be left indwelling following the procedure.  Patient understands that we will be doing this today and is NPO.  Informed consent obtained.

## 2024-06-27 NOTE — PLAN OF CARE
Problem: Adult Inpatient Plan of Care  Goal: Plan of Care Review  Outcome: Progressing  Flowsheets (Taken 6/26/2024 2142)  Plan of Care Reviewed With: patient  Goal: Optimal Comfort and Wellbeing  Outcome: Progressing  Intervention: Monitor Pain and Promote Comfort  Flowsheets (Taken 6/26/2024 2142)  Pain Management Interventions:   pain management plan reviewed with patient/caregiver   pillow support provided   position adjusted   quiet environment facilitated   relaxation techniques promoted  Intervention: Provide Person-Centered Care  Flowsheets (Taken 6/26/2024 2142)  Trust Relationship/Rapport:   care explained   choices provided   emotional support provided   empathic listening provided   questions answered   questions encouraged   reassurance provided   thoughts/feelings acknowledged     Problem: Infection  Goal: Absence of Infection Signs and Symptoms  Outcome: Progressing  Intervention: Prevent or Manage Infection  Flowsheets (Taken 6/26/2024 2142)  Fever Reduction/Comfort Measures:   lightweight bedding   lightweight clothing  Infection Management: aseptic technique maintained  Isolation Precautions: precautions maintained

## 2024-06-27 NOTE — OP NOTE
[facility]  [facility] Surgery  Operative Note    SUMMARY     Date of Procedure: 6/27/2024     Procedure: Procedure(s) (LRB):  CYSTOURETEROSCOPY WITH HOLMIUM LASER LITHOTRIPSY OF URETERAL CALCULUS AND STENT INSERTION (Left)       Surgeons and Role:     * Ovidio Holliday Jr., MD - Primary    Assistant:  ADDIE MENDOZA    Pre-Operative Diagnosis: Nephrolithiasis [N20.0] left with previous distal ureteral stone and left renal pelvic stones    Post-Operative Diagnosis: Post-Op Diagnosis Codes:     * Nephrolithiasis [N20.0] left with previous distal ureteral stone in left renal pelvic stones    Anesthesia: General endotracheal    Operative Findings (including complications, if any):     Description of Technical Procedures:   The patient was taken to the hospital endoscopy suite.  Satisfactory general endotracheal anesthesia was administered.  The patient was placed in the modified dorsal lithotomy position preparing him for left ureteral renoscopy.  The urethra was tight on Eugene bougie is and we calibrated him and dilated with a bougie is up through 22 Andorran.  The 21 Andorran Olympus rigid cystoscope was passed transurethrally into the bladder with the aid of the Olympus digital camera system which was also used for rigid ureteroscopy.  The anterior urethra appeared clear.  Posterior urethra had significant obstruction from prostatic hyperplasia but there was not total obstruction present.  Bladder was entered and no tumors stones or diverticula were seen.  Only mild trabeculations were present.  Ureteral orifices normal in size shape and position.  Ureteral access catheter was passed into the left ureter.  A 0.038 guidewire was passed to the left kidney.  The access catheter was moved to the other port and replaced into the left ureter.  A 2nd 0.038 guide wire to the left kidney.  The 2 guidewires were left in place and the cystoscope removed.  The Shahid short dual chamber ureteroscope was  passed over 1 of the guidewires transurethrally into the distal ureter because a stone had been noted on CT scan yesterday that was present at the level of the ureterovesical junction.  No stone was seen at the ureterovesical junction and the scope was passed all way up ureter to his felt and went all the way into the upper ureter with no evidence of abnormalities.  I did not see any evidence that a stone had been present like he had been seen on the CT scan.  Ureteroscope was removed.  A navigator with obturator was then used to help access the left collecting system as it was known stone present in the left renal pelvis and would require flexible scope.  Obturator was passed 1st successfully and then it was replaced into the navigator and both were passed over the guidewire up the ureter and ureter was successfully dilated with 13/15F navigator access sheath.  The Olympus digital flexible ureteral renal scope was passed up the navigator and into the upper ureter and passed on into the left kidney successfully.  There was lot of debris present in the kidney.  Some of that washed out.  The renal pelvis was followed and the large stone that had been in the left renal pelvis was easily accessed and seemed to be in good location for fragmentation.  We were able to pass the scope into the lower pole infundibulum and find 3 additional smaller stones in the lower pole.  We fragmented the largest of the stones which was the renal pelvic stone 1st using the 200 micron holmium laser fiber.  Stone did break up nicely.  We were able to get access to the lower pole calices and pass the 200 micron holmium laser fiber down to break the stones up into pieces small enough to pass.  I do feel we were successful in getting the stone fragments and pieces that patient will be able to pass.  Fragmentation was completed.  We actually fragmented for 7 minutes 19 seconds which results in 4.22 kg joules of energy being created.  Was felt  stone fragments were passable size that time.  The ureteral scope was removed and no large stone fragments were noted although there did seem to be a column of stones present in the long the upper pole infundibulum.  Navigator was removed.  Cystoscope was placed back into the bladder over the safety wire which was converted to the working wire.  An 8 Gabonese cook variable length ureteral stent was passed into the left collecting system.  Satisfactory curl was noted in the left renal pelvis and satisfactory curl was noted in the bladder after the cystoscope was removed.  Good positioning of the stent seemed to be present.  Patient tolerated procedure well and left for PACU in satisfactory condition.  There were no complications and blood loss was felt to be less than 10 mL.    Significant Surgical Tasks Conducted by the Assistant(s), if Applicable:     Estimated Blood Loss (EBL): 10 mL           Implants:   Implant Name Type Inv. Item Serial No.  Lot No. LRB No. Used Action   STENT UNIVERSA SFT 8F 22-32CM - [id number]  STENT UNIVERSA SFT 8F 22-32CM  [employer] [id number] Left 1 Implanted       Specimens:   Specimen (24h ago, onward)      None                    Condition: Stable    Disposition: PACU - hemodynamically stable.    Attestation: I was present and scrubbed for the entire procedure.

## 2024-06-27 NOTE — ANESTHESIA PROCEDURE NOTES
Intubation    Date/Time: 6/27/2024 11:39 AM    Performed by: Wang Ordonez CRNA  Authorized by: Jimmy Brown MD    Intubation:     Induction:  Intravenous    Intubated:  Postinduction    Mask Ventilation:  Very difficult with oral airway    Attempts:  1    Attempted By:  CRNA    Method of Intubation:  Direct    Blade:  Daria 4    Laryngeal View Grade: Grade I - full view of cords      Difficult Airway Encountered?: No      Complications:  None    Airway Device:  Oral endotracheal tube    Airway Device Size:  7.5    Style/Cuff Inflation:  Cuffed (inflated to minimal occlusive pressure)    Inflation Amount (mL):  7    Tube secured:  21    Secured at:  The lips    Placement Verified By:  Capnometry    Complicating Factors:  None    Findings Post-Intubation:  BS equal bilateral and atraumatic/condition of teeth unchanged

## 2024-06-28 ENCOUNTER — TELEPHONE (OUTPATIENT)
Dept: UROLOGY | Facility: CLINIC | Age: 59
End: 2024-06-28

## 2024-06-28 VITALS
DIASTOLIC BLOOD PRESSURE: 57 MMHG | SYSTOLIC BLOOD PRESSURE: 126 MMHG | WEIGHT: 180.13 LBS | HEIGHT: 71 IN | BODY MASS INDEX: 25.22 KG/M2 | TEMPERATURE: 99 F | RESPIRATION RATE: 18 BRPM | HEART RATE: 53 BPM | OXYGEN SATURATION: 95 %

## 2024-06-28 DIAGNOSIS — N20.0 KIDNEY STONES: Primary | ICD-10-CM

## 2024-06-28 LAB
ANION GAP SERPL CALCULATED.3IONS-SCNC: 11 MMOL/L (ref 7–16)
BASOPHILS # BLD AUTO: 0.02 K/UL (ref 0–0.2)
BASOPHILS NFR BLD AUTO: 0.2 % (ref 0–1)
BUN SERPL-MCNC: 9 MG/DL (ref 7–18)
BUN/CREAT SERPL: 12 (ref 6–20)
CALCIUM SERPL-MCNC: 8.1 MG/DL (ref 8.5–10.1)
CHLORIDE SERPL-SCNC: 106 MMOL/L (ref 98–107)
CO2 SERPL-SCNC: 26 MMOL/L (ref 21–32)
CREAT SERPL-MCNC: 0.73 MG/DL (ref 0.7–1.3)
DIFFERENTIAL METHOD BLD: ABNORMAL
EGFR (NO RACE VARIABLE) (RUSH/TITUS): 105 ML/MIN/1.73M2
EOSINOPHIL # BLD AUTO: 0.05 K/UL (ref 0–0.5)
EOSINOPHIL NFR BLD AUTO: 0.6 % (ref 1–4)
ERYTHROCYTE [DISTWIDTH] IN BLOOD BY AUTOMATED COUNT: 13.7 % (ref 11.5–14.5)
GLUCOSE SERPL-MCNC: 67 MG/DL (ref 74–106)
HCT VFR BLD AUTO: 39.7 % (ref 40–54)
HGB BLD-MCNC: 12.5 G/DL (ref 13.5–18)
IMM GRANULOCYTES # BLD AUTO: 0.04 K/UL (ref 0–0.04)
IMM GRANULOCYTES NFR BLD: 0.5 % (ref 0–0.4)
LYMPHOCYTES # BLD AUTO: 1.43 K/UL (ref 1–4.8)
LYMPHOCYTES NFR BLD AUTO: 17.6 % (ref 27–41)
MCH RBC QN AUTO: 28.6 PG (ref 27–31)
MCHC RBC AUTO-ENTMCNC: 31.5 G/DL (ref 32–36)
MCV RBC AUTO: 90.8 FL (ref 80–96)
METHICILLIN RESISTANT STAPHYLOCOCCUS AUREUS: NEGATIVE
MONOCYTES # BLD AUTO: 0.82 K/UL (ref 0–0.8)
MONOCYTES NFR BLD AUTO: 10.1 % (ref 2–6)
MPC BLD CALC-MCNC: 10.8 FL (ref 9.4–12.4)
NEUTROPHILS # BLD AUTO: 5.78 K/UL (ref 1.8–7.7)
NEUTROPHILS NFR BLD AUTO: 71 % (ref 53–65)
NRBC # BLD AUTO: 0 X10E3/UL
NRBC, AUTO (.00): 0 %
PLATELET # BLD AUTO: 150 K/UL (ref 150–400)
POTASSIUM SERPL-SCNC: 3.6 MMOL/L (ref 3.5–5.1)
RBC # BLD AUTO: 4.37 M/UL (ref 4.6–6.2)
SODIUM SERPL-SCNC: 139 MMOL/L (ref 136–145)
UA COMPLETE W REFLEX CULTURE PNL UR: ABNORMAL
UA COMPLETE W REFLEX CULTURE PNL UR: ABNORMAL
WBC # BLD AUTO: 8.14 K/UL (ref 4.5–11)

## 2024-06-28 PROCEDURE — 80048 BASIC METABOLIC PNL TOTAL CA: CPT | Performed by: FAMILY MEDICINE

## 2024-06-28 PROCEDURE — 36415 COLL VENOUS BLD VENIPUNCTURE: CPT | Performed by: FAMILY MEDICINE

## 2024-06-28 PROCEDURE — 25000003 PHARM REV CODE 250: Performed by: FAMILY MEDICINE

## 2024-06-28 PROCEDURE — 63600175 PHARM REV CODE 636 W HCPCS: Performed by: FAMILY MEDICINE

## 2024-06-28 PROCEDURE — 85025 COMPLETE CBC W/AUTO DIFF WBC: CPT | Performed by: FAMILY MEDICINE

## 2024-06-28 PROCEDURE — 25000003 PHARM REV CODE 250: Performed by: HOSPITALIST

## 2024-06-28 PROCEDURE — 99239 HOSP IP/OBS DSCHRG MGMT >30: CPT | Mod: ,,, | Performed by: INTERNAL MEDICINE

## 2024-06-28 RX ORDER — TAMSULOSIN HYDROCHLORIDE 0.4 MG/1
0.4 CAPSULE ORAL DAILY
Qty: 30 CAPSULE | Refills: 0 | Status: SHIPPED | OUTPATIENT
Start: 2024-06-29 | End: 2025-06-29

## 2024-06-28 RX ORDER — HYDROCODONE BITARTRATE AND ACETAMINOPHEN 10; 325 MG/1; MG/1
1 TABLET ORAL EVERY 6 HOURS PRN
Qty: 30 TABLET | Refills: 0 | Status: SHIPPED | OUTPATIENT
Start: 2024-06-28

## 2024-06-28 RX ORDER — TETRACYCLINE HYDROCHLORIDE 500 MG/1
500 CAPSULE ORAL 2 TIMES DAILY
Qty: 14 CAPSULE | Refills: 0 | Status: SHIPPED | OUTPATIENT
Start: 2024-06-28

## 2024-06-28 RX ORDER — CIPROFLOXACIN 500 MG/1
500 TABLET ORAL EVERY 12 HOURS
Qty: 14 TABLET | Refills: 0 | Status: SHIPPED | OUTPATIENT
Start: 2024-06-28

## 2024-06-28 RX ADMIN — SODIUM CHLORIDE, POTASSIUM CHLORIDE, SODIUM LACTATE AND CALCIUM CHLORIDE: 600; 310; 30; 20 INJECTION, SOLUTION INTRAVENOUS at 05:06

## 2024-06-28 RX ADMIN — THERA TABS 1 TABLET: TAB at 08:06

## 2024-06-28 RX ADMIN — HYDROMORPHONE HYDROCHLORIDE 1 MG: 2 INJECTION INTRAMUSCULAR; INTRAVENOUS; SUBCUTANEOUS at 08:06

## 2024-06-28 RX ADMIN — HYDROCODONE BITARTRATE AND ACETAMINOPHEN 1 TABLET: 10; 325 TABLET ORAL at 12:06

## 2024-06-28 RX ADMIN — MEROPENEM 1 G: 1 INJECTION, POWDER, FOR SOLUTION INTRAVENOUS at 05:06

## 2024-06-28 RX ADMIN — FAMOTIDINE 20 MG: 20 TABLET, FILM COATED ORAL at 08:06

## 2024-06-28 RX ADMIN — TAMSULOSIN HYDROCHLORIDE 0.4 MG: 0.4 CAPSULE ORAL at 08:06

## 2024-06-28 NOTE — NURSING
Pt given verbal and written discharge instructions. Made aware of all upcoming apts and medication changes. Pt verbalizes understanding. IV removed with cath intact. No bleeding noted. Pt left via wheelchair per transport staff.

## 2024-06-28 NOTE — SUBJECTIVE & OBJECTIVE
Past Medical History:   Diagnosis Date    Left ureteral stone     9 mm       Past Surgical History:   Procedure Laterality Date    CYSTOURETEROSCOPY, WITH HOLMIUM LASER LITHOTRIPSY OF URETERAL CALCULUS AND STENT INSERTION Left 6/27/2024    Procedure: CYSTOURETEROSCOPY WITH HOLMIUM LASER LITHOTRIPSY OF URETERAL CALCULUS AND STENT INSERTION;  Surgeon: Ovidio Holliday Jr., MD;  Location: [facility];  Service: Urology;  Laterality: Left;    NECK SURGERY         Review of patient's allergies indicates:  No Known Allergies    No current facility-administered medications on file prior to encounter.     Current Outpatient Medications on File Prior to Encounter   Medication Sig    promethazine (PHENERGAN) 25 MG tablet Take 1 tablet (25 mg total) by mouth every 6 (six) hours as needed for Nausea.     Family History       Problem Relation (Age of Onset)    Cancer Father          Tobacco Use    Smoking status: Every Day     Current packs/day: 2.00     Average packs/day: 2.0 packs/day for 35.0 years (70.0 ttl pk-yrs)     Types: Cigarettes     Start date: 6/26/1989    Smokeless tobacco: Never   Substance and Sexual Activity    Alcohol use: Never    Drug use: Not Currently     Types: Marijuana    Sexual activity: Not Currently     Partners: Female     Review of Systems   Constitutional:  Positive for chills. Negative for activity change, appetite change, fatigue and fever.   HENT:  Negative for congestion, hearing loss, sinus pressure, sinus pain and trouble swallowing.    Eyes:  Negative for pain and visual disturbance.   Respiratory:  Negative for cough, chest tightness, shortness of breath and wheezing.    Cardiovascular:  Negative for chest pain and palpitations.   Gastrointestinal:  Positive for abdominal pain and vomiting. Negative for blood in stool, constipation and nausea.   Genitourinary:  Positive for flank pain. Negative for difficulty urinating, dysuria and urgency.   Musculoskeletal:  Positive for back pain.  Negative for gait problem, neck pain and neck stiffness.   Skin:  Negative for color change, pallor and rash.   Neurological:  Negative for dizziness, speech difficulty, weakness, numbness and headaches.   Hematological:  Negative for adenopathy. Does not bruise/bleed easily.   Psychiatric/Behavioral:  Negative for confusion, decreased concentration and suicidal ideas.      Objective:     Vital Signs (Most Recent):  Temp: 98.9 °F (37.2 °C) (06/28/24 0753)  Pulse: (!) 56 (06/28/24 0753)  Resp: 18 (06/28/24 0815)  BP: (!) 167/89 (06/28/24 0753)  SpO2: 96 % (06/28/24 0753) Vital Signs (24h Range):  Temp:  [97.5 °F (36.4 °C)-98.9 °F (37.2 °C)] 98.9 °F (37.2 °C)  Pulse:  [47-63] 56  Resp:  [12-18] 18  SpO2:  [85 %-100 %] 96 %  BP: (103-167)/(38-89) 167/89     Weight: 81.7 kg (180 lb 1.9 oz)  Body mass index is 25.12 kg/m².     Physical Exam  Vitals reviewed.   Constitutional:       General: He is awake. He is not in acute distress.     Appearance: He is well-developed. He is not toxic-appearing.   HENT:      Head: Normocephalic and atraumatic.      Right Ear: External ear normal.      Left Ear: External ear normal.      Nose: Nose normal.      Mouth/Throat:      Pharynx: Oropharynx is clear.   Eyes:      General: No scleral icterus.     Extraocular Movements: Extraocular movements intact.      Pupils: Pupils are equal, round, and reactive to light.   Neck:      Thyroid: No thyroid mass.      Vascular: No carotid bruit.   Cardiovascular:      Rate and Rhythm: Normal rate and regular rhythm.      Pulses: Normal pulses.      Heart sounds: Normal heart sounds. No murmur heard.  Pulmonary:      Effort: Pulmonary effort is normal. No respiratory distress.      Breath sounds: Normal breath sounds and air entry. No wheezing.   Abdominal:      General: Abdomen is flat. Bowel sounds are normal. There is no distension.      Palpations: Abdomen is soft.      Tenderness: There is no abdominal tenderness.   Musculoskeletal:          General: Normal range of motion.      Cervical back: Neck supple. No rigidity.      Right lower leg: No edema.      Left lower leg: No edema.   Skin:     General: Skin is warm and dry.      Coloration: Skin is not jaundiced.      Findings: No lesion.   Neurological:      General: No focal deficit present.      Mental Status: He is alert and oriented to person, place, and time.      Cranial Nerves: No cranial nerve deficit.   Psychiatric:         Attention and Perception: Attention normal.         Mood and Affect: Mood normal.         Behavior: Behavior normal. Behavior is cooperative.         Thought Content: Thought content normal.         Cognition and Memory: Cognition normal.              CRANIAL NERVES     CN III, IV, VI   Pupils are equal, round, and reactive to light.       Significant Labs: All pertinent labs within the past 24 hours have been reviewed.  BMP:   Recent Labs   Lab 06/28/24  0410   GLU 67*      K 3.6      CO2 26   BUN 9   CREATININE 0.73   CALCIUM 8.1*     CBC:   Recent Labs   Lab 06/27/24  0514 06/28/24  0410   WBC 10.82 8.14   HGB 13.9 12.5*   HCT 43.0 39.7*   * 150       Significant Imaging: I have reviewed all pertinent imaging results/findings within the past 24 hours.

## 2024-06-28 NOTE — PROGRESS NOTES
[facility] - [address] [place] [address] Medicine  Progress Note    Patient Name: Mickey Quiles  MRN: [id number]  Patient Class: IP- Inpatient   Admission Date: 6/26/2024  Length of Stay: 2 days  Attending Physician: Alonso Zuniga MD  Primary Care Provider: Patricia, Primary Doctor        Subjective:     Principal Problem:Nephrolithiasis        HPI:  Patient is a 58-year-old white male who awakened from sleep about 1:00 a.m. with left-sided abdominal pain.  Patient had subjective fever and chills as well.  Patient has history of kidney stones.  Patient salt care in [facility] ED and was found to have a 14 mm stone at the left uretovesicular junction.  Evaluation revealed 182 white blood cells in his urine nitrite positive.  On CBC white count was 13.  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    Overview/Hospital Course:  06/27 Records reviewed. Seen with Dr Holliday.  surgery earlier today.   06/28/2024   Patient has left flank pain, no other complaints.  No fever, vital signs stable.    White blood cell count 8 hemoglobin 12  Urine culture grew E coli and Enterococcus faecalis.    Patient is status post lithotripsy with ureteral stent placement.    We will continue antibiotics, continue to follow with Urology recommendations.    Past Medical History:   Diagnosis Date    Left ureteral stone     9 mm       Past Surgical History:   Procedure Laterality Date    CYSTOURETEROSCOPY, WITH HOLMIUM LASER LITHOTRIPSY OF URETERAL CALCULUS AND STENT INSERTION Left 6/27/2024    Procedure: CYSTOURETEROSCOPY WITH HOLMIUM LASER LITHOTRIPSY OF URETERAL CALCULUS AND STENT INSERTION;  Surgeon: Ovidio Holliday Jr., MD;  Location: [facility];  Service: Urology;  Laterality: Left;    NECK SURGERY         Review of patient's allergies indicates:  No Known Allergies    No current facility-administered medications on file prior to encounter.     Current Outpatient Medications on File Prior to  Encounter   Medication Sig    promethazine (PHENERGAN) 25 MG tablet Take 1 tablet (25 mg total) by mouth every 6 (six) hours as needed for Nausea.     Family History       Problem Relation (Age of Onset)    Cancer Father          Tobacco Use    Smoking status: Every Day     Current packs/day: 2.00     Average packs/day: 2.0 packs/day for 35.0 years (70.0 ttl pk-yrs)     Types: Cigarettes     Start date: 6/26/1989    Smokeless tobacco: Never   Substance and Sexual Activity    Alcohol use: Never    Drug use: Not Currently     Types: Marijuana    Sexual activity: Not Currently     Partners: Female     Review of Systems   Constitutional:  Positive for chills. Negative for activity change, appetite change, fatigue and fever.   HENT:  Negative for congestion, hearing loss, sinus pressure, sinus pain and trouble swallowing.    Eyes:  Negative for pain and visual disturbance.   Respiratory:  Negative for cough, chest tightness, shortness of breath and wheezing.    Cardiovascular:  Negative for chest pain and palpitations.   Gastrointestinal:  Positive for abdominal pain and vomiting. Negative for blood in stool, constipation and nausea.   Genitourinary:  Positive for flank pain. Negative for difficulty urinating, dysuria and urgency.   Musculoskeletal:  Positive for back pain. Negative for gait problem, neck pain and neck stiffness.   Skin:  Negative for color change, pallor and rash.   Neurological:  Negative for dizziness, speech difficulty, weakness, numbness and headaches.   Hematological:  Negative for adenopathy. Does not bruise/bleed easily.   Psychiatric/Behavioral:  Negative for confusion, decreased concentration and suicidal ideas.      Objective:     Vital Signs (Most Recent):  Temp: 98.9 °F (37.2 °C) (06/28/24 0753)  Pulse: (!) 56 (06/28/24 0753)  Resp: 18 (06/28/24 0815)  BP: (!) 167/89 (06/28/24 0753)  SpO2: 96 % (06/28/24 0753) Vital Signs (24h Range):  Temp:  [97.5 °F (36.4 °C)-98.9 °F (37.2 °C)] 98.9 °F  (37.2 °C)  Pulse:  [47-63] 56  Resp:  [12-18] 18  SpO2:  [85 %-100 %] 96 %  BP: (103-167)/(38-89) 167/89     Weight: 81.7 kg (180 lb 1.9 oz)  Body mass index is 25.12 kg/m².     Physical Exam  Vitals reviewed.   Constitutional:       General: He is awake. He is not in acute distress.     Appearance: He is well-developed. He is not toxic-appearing.   HENT:      Head: Normocephalic and atraumatic.      Right Ear: External ear normal.      Left Ear: External ear normal.      Nose: Nose normal.      Mouth/Throat:      Pharynx: Oropharynx is clear.   Eyes:      General: No scleral icterus.     Extraocular Movements: Extraocular movements intact.      Pupils: Pupils are equal, round, and reactive to light.   Neck:      Thyroid: No thyroid mass.      Vascular: No carotid bruit.   Cardiovascular:      Rate and Rhythm: Normal rate and regular rhythm.      Pulses: Normal pulses.      Heart sounds: Normal heart sounds. No murmur heard.  Pulmonary:      Effort: Pulmonary effort is normal. No respiratory distress.      Breath sounds: Normal breath sounds and air entry. No wheezing.   Abdominal:      General: Abdomen is flat. Bowel sounds are normal. There is no distension.      Palpations: Abdomen is soft.      Tenderness: There is no abdominal tenderness.   Musculoskeletal:         General: Normal range of motion.      Cervical back: Neck supple. No rigidity.      Right lower leg: No edema.      Left lower leg: No edema.   Skin:     General: Skin is warm and dry.      Coloration: Skin is not jaundiced.      Findings: No lesion.   Neurological:      General: No focal deficit present.      Mental Status: He is alert and oriented to person, place, and time.      Cranial Nerves: No cranial nerve deficit.   Psychiatric:         Attention and Perception: Attention normal.         Mood and Affect: Mood normal.         Behavior: Behavior normal. Behavior is cooperative.         Thought Content: Thought content normal.         Cognition  and Memory: Cognition normal.              CRANIAL NERVES     CN III, IV, VI   Pupils are equal, round, and reactive to light.       Significant Labs: All pertinent labs within the past 24 hours have been reviewed.  BMP:   Recent Labs   Lab 06/28/24  0410   GLU 67*      K 3.6      CO2 26   BUN 9   CREATININE 0.73   CALCIUM 8.1*     CBC:   Recent Labs   Lab 06/27/24  0514 06/28/24  0410   WBC 10.82 8.14   HGB 13.9 12.5*   HCT 43.0 39.7*   * 150       Significant Imaging: I have reviewed all pertinent imaging results/findings within the past 24 hours.    Assessment/Plan:      * Nephrolithiasis    Patient with large stone close to the bladder.  Urology is aware.  Probable intervention tomorrow.  Pain control, start Flomax    Hypokalemia  Patient has hypokalemia which is Acute, mild, and currently uncontrolled. Most recent potassium levels reviewed-   Lab Results   Component Value Date    K 3.3 (L) 06/26/2024   . Will continue potassium replacement per protocol and recheck repeat levels after replacement completed.     We will give lactated Ringer's and p.o. potassium    Acute pyelonephritis    Patient with subjective fever and chills elevated white blood cell count and pyuria on UA.  Nitrite is also positive.  We will treat as pyelonephritis.  Vanc and Merrem pending culture results      VTE Risk Mitigation (From admission, onward)           Ordered     IP VTE HIGH RISK PATIENT  Once         06/27/24 1325     Place sequential compression device  Until discontinued         06/27/24 1325     Place CRISTHIAN hose  Until discontinued        Comments: Thigh high CRISTHIAN hose or SCD hose but not both.    06/26/24 1543                    Discharge Planning   MARGOTH:      Code Status: Full Code   Is the patient medically ready for discharge?:     Reason for patient still in hospital (select all that apply): Treatment  Discharge Plan A: Other (Pt advises he is homeless and stays in either his truck or in motels; his son  "(Dave Etta) is homeless, as well. Pt refuses assistance to access to local homeless shelter. Pt states "I'm moving to [place].")                  Alonso Zuniga MD  Department of Hospital Medicine   [facility] - [address] [place] [address]    "

## 2024-06-28 NOTE — ANESTHESIA POSTPROCEDURE EVALUATION
Anesthesia Post Evaluation    Patient: Mickey Quiles    Procedure(s) Performed: Procedure(s) (LRB):  CYSTOURETEROSCOPY WITH HOLMIUM LASER LITHOTRIPSY OF URETERAL CALCULUS AND STENT INSERTION (Left)    Final Anesthesia Type: general      Patient location during evaluation: PACU  Post-procedure vital signs: reviewed and stable  Pain management: adequate  Airway patency: patent    PONV status at discharge: No PONV  Anesthetic complications: no      Cardiovascular status: hemodynamically stable  Respiratory status: unassisted  Hydration status: euvolemic  Follow-up not needed.              Vitals Value Taken Time   /89 06/28/24 0753   Temp 37.2 °C (98.9 °F) 06/28/24 0753   Pulse 56 06/28/24 0753   Resp 18 06/28/24 0815   SpO2 96 % 06/28/24 0753         Event Time   Out of Recovery 06/27/2024 14:05:00         Pain/Salvador Score: Pain Rating Prior to Med Admin: 9 (6/28/2024  8:15 AM)  Pain Rating Post Med Admin: 4 (6/28/2024  1:20 AM)  Salvador Score: 10 (6/27/2024  2:00 PM)

## 2024-06-28 NOTE — HOSPITAL COURSE
06/27 Records reviewed. Seen with Dr Holliday.  surgery earlier today.   06/28/2024   Patient has left flank pain, no other complaints.  No fever, vital signs stable.    White blood cell count 8 hemoglobin 12  Urine culture grew Enterococcus and Enterobacter.  Patient is status post lithotripsy with ureteral stent placement.    We will continue antibiotics, continue to follow with Urology recommendations.  Discussed with Dr. Holliday, patient is cleared for discharge from Neurology standpoint, we will discharge him on Cipro and tetracycline as per sensitivity results, Dr. Holliday will see him on Monday, repeat CT scan and re-culture him.    Discharged in stable medical condition.

## 2024-06-28 NOTE — DISCHARGE INSTRUCTIONS
Hospitalist Discharge orders  *Notify your healthcare provider if you experience any of the following: temperature >100.4  *Notify your healthcare provider if you experience any of the following: redness, tenderness.  *Notify your healthcare provider if you experience any of the following: difficulty breathing or increased cough.  *Notify your physician if you experience any persistent nausea, vomiting, or diarrhea or headache  *Notify your physician if you experience any of the following:severe uncontrolled pain;worsening rash, increased confusion or weakness; dizziness, lightheadedness or visual disturbances.

## 2024-06-28 NOTE — PLAN OF CARE
[facility]  [facility]charge Final Note    Primary Care Provider: No, Primary Doctor    Expected Discharge Date: 6/28/2024    Final Discharge Note (most recent)       Final Note - 06/28/24 1101          Final Note    Assessment Type Final Discharge Note     Anticipated Discharge Disposition Home or Self Care                     Important Message from Medicare-self pay             Contact Info       Ovidio Holliday Jr., MD   Specialty: Urology    [address] [place], [place] [address]   Phone: [phone]       Next Steps: Follow up in 1 week(s)          Dc to self care.

## 2024-06-28 NOTE — DISCHARGE SUMMARY
[facility] Hospital Medicine  Discharge Summary      Patient Name: Mickey Quiles  MRN: [id number]  RODRIGUEZ: [id number]  Patient Class: IP- Inpatient  Admission Date: 6/26/2024  Hospital Length of Stay: 2 days  Discharge Date and Time:  06/28/2024 10:25 AM  Attending Physician: Alonso Zuniga MD   Discharging Provider: Alonso Zuniga MD  Primary Care Provider: Patricia, Primary Doctor    Primary Care Team: Networked reference to record PCT     HPI:   Patient is a 58-year-old white male who awakened from sleep about 1:00 a.m. with left-sided abdominal pain.  Patient had subjective fever and chills as well.  Patient has history of kidney stones.  Patient salt care in [facility] ED and was found to have a 14 mm stone at the left uretovesicular junction.  Evaluation revealed 182 white blood cells in his urine nitrite positive.  On CBC white count was 13.  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    Procedure(s) (LRB):  CYSTOURETEROSCOPY WITH HOLMIUM LASER LITHOTRIPSY OF URETERAL CALCULUS AND STENT INSERTION (Left)      Hospital Course:   06/27 Records reviewed. Seen with Dr Holliday.  surgery earlier today.   06/28/2024   Patient has left flank pain, no other complaints.  No fever, vital signs stable.    White blood cell count 8 hemoglobin 12  Urine culture grew Enterococcus and Enterobacter.  Patient is status post lithotripsy with ureteral stent placement.    We will continue antibiotics, continue to follow with Urology recommendations.  Discussed with Dr. Holliday, patient is cleared for discharge from Neurology standpoint, we will discharge him on Cipro and tetracycline as per sensitivity results, Dr. Holliday will see him on Monday, repeat CT scan and re-culture him.    Discharged in stable medical condition.     Goals of Care Treatment Preferences:  Code Status: Full Code      Consults:   Consults (From admission, onward)          Status Ordering Provider      Pharmacy to renally dose medication  Once        Provider:  (Not yet assigned)    Acknowledged CANDELARIO PATTERSON JR     Inpatient consult to Urology  Once        Provider:  Ovidio Holliday Jr., MD    Completed CANDELARIO PATTERSON JR     Inpatient consult to Urology  Once        Provider:  vOidio Holliday Jr., MD    Completed CANDELARIO PATTERSON JR            No new Assessment & Plan notes have been filed under this hospital service since the last note was generated.  Service: Hospital Medicine    Final Active Diagnoses:    Diagnosis Date Noted POA    PRINCIPAL PROBLEM:  Nephrolithiasis [N20.0] 06/26/2024 Yes    Acute pyelonephritis [N10] 06/26/2024 Yes    Hypokalemia [E87.6] 06/26/2024 Yes      Problems Resolved During this Admission:       Discharged Condition: good    Disposition: Home or Self Care    Follow Up:   Follow-up Information       Ovidio Holliday Jr., MD Follow up in 1 week(s).    Specialty: Urology  Contact information:  [address] [place] [id number]  [phone]                           Patient Instructions:   No discharge procedures on file.    Significant Diagnostic Studies: N/A    Pending Diagnostic Studies:       Procedure Component Value Units Date/Time    MRSA by PCR [[id number]] Collected: 06/27/24 2138    Order Status: Sent Lab Status: In process Updated: 06/27/24 2205    Specimen: Respiratory from Nasal Swab            Medications:  Reconciled Home Medications:      Medication List        START taking these medications      ciprofloxacin HCl 500 MG tablet  Commonly known as: CIPRO  Take 1 tablet (500 mg total) by mouth every 12 (twelve) hours.     HYDROcodone-acetaminophen  mg per tablet  Commonly known as: NORCO  Take 1 tablet by mouth every 6 (six) hours as needed for Pain.     tamsulosin 0.4 mg Cap  Commonly known as: FLOMAX  Take 1 capsule (0.4 mg total) by mouth once daily.  Start taking on: June 29, 2024     tetracycline 500 MG capsule  Commonly known as:  ACHROMYCIN,SUMYCIN  Take 1 capsule (500 mg total) by mouth 2 (two) times daily.            CONTINUE taking these medications      promethazine 25 MG tablet  Commonly known as: PHENERGAN  Take 1 tablet (25 mg total) by mouth every 6 (six) hours as needed for Nausea.              Indwelling Lines/Drains at time of discharge:   Lines/Drains/Airways       None                   Time spent on the discharge of patient: 30 minutes         Alonso Zuniga MD  Department of Hospital Medicine  [facility]

## 2024-06-28 NOTE — PROGRESS NOTES
Patient seen last evening and was complaining of rather severe left flank pain.  Seen again this morning and still complaining of left flank pain but some of this may be related to his chronic back problem.  String is still intact and he has an indwelling left ureteral stent.  Patient can be discharged to remove his own to stent on Monday morning July 1.  We will arrange for him to be seen in clinic in about 2 weeks with a CT scan.  Discussed with Dr. Zuniga.

## 2024-06-28 NOTE — TELEPHONE ENCOUNTER
Post op Lithotripsy patient scheduled for 2-3 week post op visit with renal CT scan as ordered by Dr. Holliday.   CT scheduled for Thurs. 7/18/24 at 1:30 with post op visit at 2:00; NANCY Lara [facility] was called and notified for discharge instructions.

## 2024-06-30 ENCOUNTER — HOSPITAL ENCOUNTER (EMERGENCY)
Facility: HOSPITAL | Age: 59
Discharge: HOME | End: 2024-06-30

## 2024-06-30 VITALS
OXYGEN SATURATION: 95 % | HEART RATE: 63 BPM | TEMPERATURE: 99 F | DIASTOLIC BLOOD PRESSURE: 51 MMHG | SYSTOLIC BLOOD PRESSURE: 175 MMHG | BODY MASS INDEX: 25.55 KG/M2 | WEIGHT: 182.5 LBS | RESPIRATION RATE: 18 BRPM | HEIGHT: 71 IN

## 2024-06-30 DIAGNOSIS — N39.0: ICD-10-CM

## 2024-06-30 DIAGNOSIS — R60.0 BILATERAL LOWER EXTREMITY EDEMA: Primary | ICD-10-CM

## 2024-06-30 DIAGNOSIS — T81.49XA: ICD-10-CM

## 2024-06-30 LAB
ALBUMIN SERPL BCP-MCNC: 3.2 G/DL (ref 3.5–5)
ALBUMIN/GLOB SERPL: 0.8 {RATIO}
ALP SERPL-CCNC: 90 U/L (ref 45–115)
ALT SERPL W P-5'-P-CCNC: 53 U/L (ref 16–61)
ANION GAP SERPL CALCULATED.3IONS-SCNC: 10 MMOL/L (ref 7–16)
AST SERPL W P-5'-P-CCNC: 36 U/L (ref 15–37)
BACTERIA #/AREA URNS HPF: ABNORMAL /HPF
BASOPHILS # BLD AUTO: 0.04 K/UL (ref 0–0.2)
BASOPHILS NFR BLD AUTO: 0.5 % (ref 0–1)
BILIRUB SERPL-MCNC: 0.5 MG/DL (ref ?–1.2)
BILIRUB UR QL STRIP: NEGATIVE
BUN SERPL-MCNC: 11 MG/DL (ref 7–18)
BUN/CREAT SERPL: 13 (ref 6–20)
CALCIUM SERPL-MCNC: 8.8 MG/DL (ref 8.5–10.1)
CHLORIDE SERPL-SCNC: 102 MMOL/L (ref 98–107)
CLARITY UR: CLEAR
CO2 SERPL-SCNC: 30 MMOL/L (ref 21–32)
COLOR UR: ABNORMAL
CREAT SERPL-MCNC: 0.87 MG/DL (ref 0.7–1.3)
DIFFERENTIAL METHOD BLD: ABNORMAL
EGFR (NO RACE VARIABLE) (RUSH/TITUS): 100 ML/MIN/1.73M2
EOSINOPHIL # BLD AUTO: 0.09 K/UL (ref 0–0.5)
EOSINOPHIL NFR BLD AUTO: 1.1 % (ref 1–4)
ERYTHROCYTE [DISTWIDTH] IN BLOOD BY AUTOMATED COUNT: 13.5 % (ref 11.5–14.5)
GLOBULIN SER-MCNC: 3.8 G/DL (ref 2–4)
GLUCOSE SERPL-MCNC: 89 MG/DL (ref 74–106)
GLUCOSE UR STRIP-MCNC: NORMAL MG/DL
HCT VFR BLD AUTO: 43 % (ref 40–54)
HGB BLD-MCNC: 13.9 G/DL (ref 13.5–18)
IMM GRANULOCYTES # BLD AUTO: 0.03 K/UL (ref 0–0.04)
IMM GRANULOCYTES NFR BLD: 0.4 % (ref 0–0.4)
KETONES UR STRIP-SCNC: NEGATIVE MG/DL
LEUKOCYTE ESTERASE UR QL STRIP: ABNORMAL
LYMPHOCYTES # BLD AUTO: 2.19 K/UL (ref 1–4.8)
LYMPHOCYTES NFR BLD AUTO: 27.4 % (ref 27–41)
MAGNESIUM SERPL-MCNC: 1.7 MG/DL (ref 1.7–2.3)
MCH RBC QN AUTO: 28.9 PG (ref 27–31)
MCHC RBC AUTO-ENTMCNC: 32.3 G/DL (ref 32–36)
MCV RBC AUTO: 89.4 FL (ref 80–96)
MONOCYTES # BLD AUTO: 0.82 K/UL (ref 0–0.8)
MONOCYTES NFR BLD AUTO: 10.3 % (ref 2–6)
MPC BLD CALC-MCNC: 10.1 FL (ref 9.4–12.4)
MUCOUS, UA: ABNORMAL /LPF
NEUTROPHILS # BLD AUTO: 4.82 K/UL (ref 1.8–7.7)
NEUTROPHILS NFR BLD AUTO: 60.3 % (ref 53–65)
NITRITE UR QL STRIP: NEGATIVE
NRBC # BLD AUTO: 0 X10E3/UL
NRBC, AUTO (.00): 0 %
NT-PROBNP SERPL-MCNC: 138 PG/ML (ref 1–125)
PH UR STRIP: 6 PH UNITS
PLATELET # BLD AUTO: 210 K/UL (ref 150–400)
POTASSIUM SERPL-SCNC: 3.9 MMOL/L (ref 3.5–5.1)
PROT SERPL-MCNC: 7 G/DL (ref 6.4–8.2)
PROT UR QL STRIP: 70
RBC # BLD AUTO: 4.81 M/UL (ref 4.6–6.2)
RBC # UR STRIP: ABNORMAL /UL
RBC #/AREA URNS HPF: 93 /HPF
SODIUM SERPL-SCNC: 138 MMOL/L (ref 136–145)
SP GR UR STRIP: 1.01
SQUAMOUS #/AREA URNS LPF: ABNORMAL /HPF
TROPONIN I SERPL DL<=0.01 NG/ML-MCNC: 5.7 PG/ML
UROBILINOGEN UR STRIP-ACNC: NORMAL MG/DL
WBC # BLD AUTO: 7.99 K/UL (ref 4.5–11)
WBC #/AREA URNS HPF: 68 /HPF

## 2024-06-30 PROCEDURE — 96375 TX/PRO/DX INJ NEW DRUG ADDON: CPT

## 2024-06-30 PROCEDURE — 80053 COMPREHEN METABOLIC PANEL: CPT | Performed by: NURSE PRACTITIONER

## 2024-06-30 PROCEDURE — 84484 ASSAY OF TROPONIN QUANT: CPT | Performed by: NURSE PRACTITIONER

## 2024-06-30 PROCEDURE — 63600175 PHARM REV CODE 636 W HCPCS: Mod: JZ,JG | Performed by: NURSE PRACTITIONER

## 2024-06-30 PROCEDURE — 87086 URINE CULTURE/COLONY COUNT: CPT | Performed by: NURSE PRACTITIONER

## 2024-06-30 PROCEDURE — 93010 ELECTROCARDIOGRAM REPORT: CPT | Mod: ,,, | Performed by: HOSPITALIST

## 2024-06-30 PROCEDURE — 96361 HYDRATE IV INFUSION ADD-ON: CPT

## 2024-06-30 PROCEDURE — 83880 ASSAY OF NATRIURETIC PEPTIDE: CPT | Performed by: NURSE PRACTITIONER

## 2024-06-30 PROCEDURE — 81003 URINALYSIS AUTO W/O SCOPE: CPT | Performed by: NURSE PRACTITIONER

## 2024-06-30 PROCEDURE — 96374 THER/PROPH/DIAG INJ IV PUSH: CPT

## 2024-06-30 PROCEDURE — 93005 ELECTROCARDIOGRAM TRACING: CPT

## 2024-06-30 PROCEDURE — 99285 EMERGENCY DEPT VISIT HI MDM: CPT | Mod: 25

## 2024-06-30 PROCEDURE — 83735 ASSAY OF MAGNESIUM: CPT | Performed by: NURSE PRACTITIONER

## 2024-06-30 PROCEDURE — 36415 COLL VENOUS BLD VENIPUNCTURE: CPT | Performed by: NURSE PRACTITIONER

## 2024-06-30 PROCEDURE — 25000003 PHARM REV CODE 250: Performed by: NURSE PRACTITIONER

## 2024-06-30 PROCEDURE — 85025 COMPLETE CBC W/AUTO DIFF WBC: CPT | Performed by: NURSE PRACTITIONER

## 2024-06-30 RX ORDER — MORPHINE SULFATE 4 MG/ML
4 INJECTION, SOLUTION INTRAMUSCULAR; INTRAVENOUS
Status: COMPLETED | OUTPATIENT
Start: 2024-06-30 | End: 2024-06-30

## 2024-06-30 RX ORDER — ONDANSETRON HYDROCHLORIDE 2 MG/ML
4 INJECTION, SOLUTION INTRAVENOUS
Status: COMPLETED | OUTPATIENT
Start: 2024-06-30 | End: 2024-06-30

## 2024-06-30 RX ADMIN — SODIUM CHLORIDE 1000 ML: 9 INJECTION, SOLUTION INTRAVENOUS at 09:06

## 2024-06-30 RX ADMIN — MORPHINE SULFATE 4 MG: 4 INJECTION, SOLUTION INTRAMUSCULAR; INTRAVENOUS at 09:06

## 2024-06-30 RX ADMIN — ONDANSETRON 4 MG: 2 INJECTION INTRAMUSCULAR; INTRAVENOUS at 09:06

## 2024-07-01 LAB
OHS QRS DURATION: 98 MS
OHS QTC CALCULATION: 416 MS

## 2024-07-01 NOTE — ED TRIAGE NOTES
Presents to ED for complaints of bilateral feet and leg swelling and bilateral calf pain.  Patient has urinary stents in place that were put in on 6/27/24.

## 2024-07-01 NOTE — DISCHARGE INSTRUCTIONS
Continue current prescribed medications including Cipro to treat urinary tract infection.  Follow up with Urology as scheduled.  Increase fluid intake to flush urinary tract and keep hydrated.  Elevate lower extremities to prevent swelling.  Return to the ER with new or worsening symptoms.

## 2024-07-02 LAB — UA COMPLETE W REFLEX CULTURE PNL UR: NO GROWTH

## 2024-07-11 NOTE — ED PROVIDER NOTES
Encounter Date: 6/30/2024       History     Chief Complaint   Patient presents with    Foot Swelling    Leg Pain     Patient presents to ER with complaint of bilateral lower extremity edema.  Patient states he was recently discharged from hospital after having urinary stents placed by Dr Holliday to treat renal calculi.  Patient reports he is also taking medication to treat uti. Patient states the stents are causing him pain, and the swelling in his legs are also causing pain.  He states he has been sitting with his legs down due to pain from the stents when he lays flat.  He denies chest pain or shortness of breath.  He is brought to ER by his sister. Reports symptoms started 2 days ago.     The history is provided by the patient. No  was used.     Review of patient's allergies indicates:  No Known Allergies  Past Medical History:   Diagnosis Date    Left ureteral stone     9 mm     Past Surgical History:   Procedure Laterality Date    CYSTOURETEROSCOPY, WITH HOLMIUM LASER LITHOTRIPSY OF URETERAL CALCULUS AND STENT INSERTION Left 6/27/2024    Procedure: CYSTOURETEROSCOPY WITH HOLMIUM LASER LITHOTRIPSY OF URETERAL CALCULUS AND STENT INSERTION;  Surgeon: Ovidio Holliday Jr., MD;  Location: [facility];  Service: Urology;  Laterality: Left;    NECK SURGERY       Family History   Problem Relation Name Age of Onset    Cancer Father       Social History     Tobacco Use    Smoking status: Every Day     Current packs/day: 2.00     Average packs/day: 2.0 packs/day for 35.0 years (70.1 ttl pk-yrs)     Types: Cigarettes     Start date: 6/26/1989    Smokeless tobacco: Never   Substance Use Topics    Alcohol use: Never    Drug use: Not Currently     Types: Marijuana     Review of Systems   Constitutional:  Positive for activity change.   Cardiovascular:  Positive for leg swelling (bilateral lower extremities).   Genitourinary:  Positive for dysuria, frequency and hematuria.        Patient recently had urinary  stents placed by Dr Holliday due to large renal calculi.  Stents are still in placed and are painful to patient.    All other systems reviewed and are negative.      Physical Exam     Initial Vitals [06/30/24 1902]   BP Pulse Resp Temp SpO2   (!) 175/51 63 18 98.8 °F (37.1 °C) 95 %      MAP       --         Physical Exam    Nursing note and vitals reviewed.  Constitutional: He appears well-developed and well-nourished.   HENT:   Head: Normocephalic.   Nose: Nose normal.   Mouth/Throat: Oropharynx is clear and moist.   Eyes: EOM are normal. Pupils are equal, round, and reactive to light.   Neck: Neck supple.   Normal range of motion.  Cardiovascular:  Normal rate, normal heart sounds and intact distal pulses.           Pulmonary/Chest: Breath sounds normal.   Abdominal: Abdomen is soft. Bowel sounds are normal.   Musculoskeletal:         General: Edema (1+ edema to bilateral llower extremities) present. Normal range of motion.      Cervical back: Normal range of motion and neck supple.     Neurological: He is alert and oriented to person, place, and time. He has normal strength. GCS score is 15. GCS eye subscore is 4. GCS verbal subscore is 5. GCS motor subscore is 6.   Skin: Skin is warm and dry. Capillary refill takes less than 2 seconds.   Psychiatric: He has a normal mood and affect. Thought content normal.         Medical Screening Exam   See Full Note    ED Course   Procedures  Labs Reviewed   URINALYSIS, REFLEX TO URINE CULTURE - Abnormal; Notable for the following components:       Result Value    Leukocytes, UA Large (*)     Protein, UA 70 (*)     Blood, UA Large (*)     All other components within normal limits   COMPREHENSIVE METABOLIC PANEL - Abnormal; Notable for the following components:    Albumin 3.2 (*)     All other components within normal limits   NT-PRO NATRIURETIC PEPTIDE - Abnormal; Notable for the following components:    ProBNP 138 (*)     All other components within normal limits   CBC WITH  DIFFERENTIAL - Abnormal; Notable for the following components:    Monocytes % 10.3 (*)     Monocytes, Absolute 0.82 (*)     All other components within normal limits   URINALYSIS, MICROSCOPIC - Abnormal; Notable for the following components:    WBC, UA 68 (*)     RBC, UA 93 (*)     Bacteria, UA Few (*)     Squamous Epithelial Cells, UA Occasional (*)     Mucous Occasional (*)     All other components within normal limits   MAGNESIUM - Normal   TROPONIN I - Normal   CULTURE, URINE   CBC W/ AUTO DIFFERENTIAL    Narrative:     The following orders were created for panel order CBC auto differential.  Procedure                               Abnormality         Status                     ---------                               -----------         ------                     CBC with Differential[[id number]]       Abnormal            Final result                 Please view results for these tests on the individual orders.        ECG Results              EKG 12-lead (Final result)        Collection Time Result Time QRS Duration [facility] QTC Calculation    06/30/24 21:04:41 07/01/24 17:26:53 98 416                     Final result by Interface, Lab In [facility] (07/01/24 17:27:02)                   Narrative:    Test Reason : R60.0,    Vent. Rate : 052 BPM     Atrial Rate : 052 BPM     P-R Int : 122 ms          QRS Dur : 098 ms      QT Int : 448 ms       P-R-T Axes : 060 076 072 degrees     QTc Int : 416 ms    Sinus bradycardia with sinus arrhythmia  Otherwise normal ECG  When compared with ECG of 04-JUN-2024 22:23,  PREVIOUS ECG IS PRESENT  Confirmed by George TANG, Honey MITCHELL ([id number]) on 7/1/2024 5:26:50 PM    Referred By: [email or web address]   SELF           Confirmed By:Honey Vazquez MD                                  Imaging Results              US Lower Extremity Veins Bilateral (Final result)  Result time 07/01/24 07:33:47      Final result by Umer Torres MD (07/01/24 07:33:47)                   Impression:       Unremarkable duplex imaging of the bilateral lower extremity. No evidence of DVT.      Electronically signed by: Umer Torres  Date:    07/01/2024  Time:    07:33               Narrative:    EXAMINATION:  US LOWER EXTREMITY VEINS BILATERAL    CLINICAL HISTORY:  Localized edema lower extremities bilaterally    TECHNIQUE:  Duplex scan of the bilateral lower extremity veins using the B-mode/grayscale imaging and Doppler spectral analysis and color-flow    COMPARISON:  No previous similar    FINDINGS:  Major venous structures of the bilateral lower extremity demonstrate a normal course and caliber. There is no evidence of deep venous thrombosis. No abnormal intrinsic echogenic lesions are demonstrated in the scanned blood vessels. The veins of the bilateral lower extremity demonstrate good compressibility with normal color flow study and spectral analysis.                                       X-Ray Chest AP Portable (Final result)  Result time 06/30/24 21:15:42      Final result by Umer Torres MD (06/30/24 21:15:42)                   Impression:      No acute cardiopulmonary process      Electronically signed by: Umer Torres  Date:    06/30/2024  Time:    21:15               Narrative:    EXAMINATION:  XR CHEST AP PORTABLE    CLINICAL HISTORY:  bilateral lower extremity pain and edema;.    COMPARISON:  No previous similar    TECHNIQUE:  Chest x-ray PA    FINDINGS:  Cardiomediastinal silhouette is unremarkable.  There is no pulmonary vascular engorgement.    Lungs and pleural spaces are clear.    There is moderate dextroscoliosis of the spine                                       Medications   sodium chloride 0.9% bolus 1,000 mL 1,000 mL (0 mLs Intravenous Stopped 6/30/24 2252)   morphine injection 4 mg (4 mg Intravenous Given 6/30/24 2115)   ondansetron injection 4 mg (4 mg Intravenous Given 6/30/24 2115)     Medical Decision Making  Patient presents to ER with complaint of bilateral lower extremity  edema.  Patient states he was recently discharged from hospital after having urinary stents placed by Dr Holliday to treat renal calculi.  Patient reports he is also taking medication to treat uti. Patient states the stents are causing him pain, and the swelling in his legs are also causing pain.  He states he has been sitting with his legs down due to pain from the stents when he lays flat.  He denies chest pain or shortness of breath.  He is brought to ER by his sister. Reports symptoms started 2 days ago.       Amount and/or Complexity of Data Reviewed  Labs: ordered. Decision-making details documented in ED Course.  Radiology: ordered. Decision-making details documented in ED Course.  Discussion of management or test interpretation with external provider(s): Initiate IV, collect lab work  1 L normal saline bolus  Morphine 4 mg IV to treat pain  Zofran 4 mg IV to prevent nausea with pain medication    Patient verbalized improvement of pain prior to discharge    Patient was discharged home with diagnosis of bilateral lower extremity edema and urinary tract infection follow up procedure.  He was instructed to continue his current medications he was previously prescribed.  He was told to keep his lower extremities elevated to avoid swollen.  He was told to keep scheduled follow up appointment with Dr. Holliday.  Patient verbalizes understanding and agrees with plan of care.    Risk  Prescription drug management.                                      Clinical Impression:   Final diagnoses:  [R60.0] Bilateral lower extremity edema (Primary)  [T81.49XA, N39.0] Urinary tract infection following procedure        ED Disposition Condition    Discharge Stable          ED Prescriptions    None       Follow-up Information    None          Sanam Montes De Oca, FNP  07/11/24 2772

## 2024-07-22 ENCOUNTER — OFFICE VISIT (OUTPATIENT)
Dept: NEUROLOGY | Facility: CLINIC | Age: 59
End: 2024-07-22

## 2024-07-22 VITALS
BODY MASS INDEX: 25.2 KG/M2 | DIASTOLIC BLOOD PRESSURE: 64 MMHG | SYSTOLIC BLOOD PRESSURE: 124 MMHG | WEIGHT: 180 LBS | HEIGHT: 71 IN | RESPIRATION RATE: 18 BRPM | OXYGEN SATURATION: 97 % | HEART RATE: 61 BPM

## 2024-07-22 DIAGNOSIS — M54.9 DORSALGIA, UNSPECIFIED: ICD-10-CM

## 2024-07-22 DIAGNOSIS — M54.16 LUMBAR RADICULOPATHY, CHRONIC: ICD-10-CM

## 2024-07-22 DIAGNOSIS — M48.02 SPINAL STENOSIS, CERVICAL REGION: ICD-10-CM

## 2024-07-22 DIAGNOSIS — R20.2 PARESTHESIA OF LEFT ARM AND LEG: Primary | ICD-10-CM

## 2024-07-22 DIAGNOSIS — M54.2 CERVICALGIA: ICD-10-CM

## 2024-07-22 PROCEDURE — 99215 OFFICE O/P EST HI 40 MIN: CPT | Mod: PBBFAC | Performed by: PSYCHIATRY & NEUROLOGY

## 2024-07-22 PROCEDURE — 99999 PR PBB SHADOW E&M-EST. PATIENT-LVL V: CPT | Mod: PBBFAC,,, | Performed by: PSYCHIATRY & NEUROLOGY

## 2024-07-22 PROCEDURE — 99204 OFFICE O/P NEW MOD 45 MIN: CPT | Mod: S$PBB,,, | Performed by: PSYCHIATRY & NEUROLOGY

## 2024-07-22 RX ORDER — GABAPENTIN 300 MG/1
300 CAPSULE ORAL 3 TIMES DAILY
Qty: 270 CAPSULE | Refills: 3 | Status: SHIPPED | OUTPATIENT
Start: 2024-07-22

## 2024-07-22 NOTE — PROGRESS NOTES
Subjective:       Patient ID: Mickey Quiles is a 58 y.o. male     Chief Complaint:    Chief Complaint   Patient presents with    Numbness     Pt. States urinating is painful can't walk far. out of energy        Allergies:  Patient has no known allergies.    Current Medications:    Outpatient Encounter Medications as of 2024   Medication Sig Dispense Refill    ciprofloxacin HCl (CIPRO) 500 MG tablet Take 1 tablet (500 mg total) by mouth every 12 (twelve) hours. 14 tablet 0    HYDROcodone-acetaminophen (NORCO)  mg per tablet Take 1 tablet by mouth every 6 (six) hours as needed for Pain. 30 tablet 0    promethazine (PHENERGAN) 25 MG tablet Take 1 tablet (25 mg total) by mouth every 6 (six) hours as needed for Nausea. 15 tablet 0    tamsulosin (FLOMAX) 0.4 mg Cap Take 1 capsule (0.4 mg total) by mouth once daily. 30 capsule 0    tetracycline (ACHROMYCIN,SUMYCIN) 500 MG capsule Take 1 capsule (500 mg total) by mouth 2 (two) times daily. 14 capsule 0    [DISCONTINUED] clindamycin (CLEOCIN) 300 MG capsule Take 1 capsule (300 mg total) by mouth every 6 (six) hours. 28 capsule 0    [DISCONTINUED] HYDROcodone-acetaminophen (NORCO) 5-325 mg per tablet Take 1 tablet by mouth every 6 (six) hours as needed for Pain. 12 tablet 0    [DISCONTINUED] tamsulosin (FLOMAX) 0.4 mg Cap Take 1 capsule (0.4 mg total) by mouth once daily. 10 capsule 0    [] potassium chloride SA CR tablet 10 mEq       [DISCONTINUED] acetaminophen tablet 650 mg       [DISCONTINUED] dextrose 10% bolus 125 mL 125 mL       [DISCONTINUED] dextrose 10% bolus 250 mL 250 mL       [DISCONTINUED] famotidine tablet 20 mg       [DISCONTINUED] glucagon (human recombinant) injection 1 mg       [DISCONTINUED] glucose chewable tablet 16 g       [DISCONTINUED] glucose chewable tablet 24 g       [DISCONTINUED] HYDROcodone-acetaminophen  mg per tablet 1 tablet       [DISCONTINUED] HYDROmorphone (PF) injection 1 mg       [DISCONTINUED] lactated  ringers infusion       [DISCONTINUED] lactated ringers infusion       [DISCONTINUED] meropenem (MERREM) 1 g in 0.9% NaCl 100 mL IVPB (MB+)       [DISCONTINUED] multivitamin tablet       [DISCONTINUED] naloxone 0.4 mg/mL injection 0.02 mg       [DISCONTINUED] ondansetron disintegrating tablet 8 mg       [DISCONTINUED] promethazine tablet 25 mg       [DISCONTINUED] sodium chloride 0.9% flush 10 mL       [DISCONTINUED] sodium chloride 0.9% flush 10 mL       [DISCONTINUED] tamsulosin 24 hr capsule 0.4 mg       [DISCONTINUED] vancomycin (VANCOCIN) 1,750 mg in D5W 500 mL IVPB       [DISCONTINUED] vancomycin - pharmacy to dose        No facility-administered encounter medications on file as of 7/22/2024.       History of Present Illness  59 yo WM older than chrono age referred for paresthesias in LUE and LLE  Recent kidney stones per UROLogy- pt thought he wa coming today to have them removed?  20 yr hx of cervical and lumbar radiculopathy w/ paresthesias   No stroke symptoms          Past Medical History:   Diagnosis Date    Left ureteral stone     9 mm       Past Surgical History:   Procedure Laterality Date    CYSTOURETEROSCOPY, WITH HOLMIUM LASER LITHOTRIPSY OF URETERAL CALCULUS AND STENT INSERTION Left 6/27/2024    Procedure: CYSTOURETEROSCOPY WITH HOLMIUM LASER LITHOTRIPSY OF URETERAL CALCULUS AND STENT INSERTION;  Surgeon: Ovidio Holliday Jr., MD;  Location: [facility];  Service: Urology;  Laterality: Left;    NECK SURGERY         Social History  Mr. Quiles  reports that he has been smoking cigarettes. He started smoking about 35 years ago. He has a 70.1 pack-year smoking history. He has never used smokeless tobacco. He reports that he does not currently use drugs after having used the following drugs: Marijuana. He reports that he does not drink alcohol.    Family History  Mr.'s Quiles family history includes Cancer in his father.    Review of Systems  Review of Systems   Musculoskeletal:  Positive for back  "pain, joint pain and neck pain.   Neurological:  Positive for tingling and sensory change.   All other systems reviewed and are negative.     Objective:   /64 (BP Location: Right arm, Patient Position: Sitting, BP Method: Large (Manual))   Pulse 61   Resp 18   Ht 5' 11" (1.803 m)   Wt 81.6 kg (180 lb)   SpO2 97%   BMI 25.10 kg/m²    NEUROLOGICAL EXAMINATION:     MENTAL STATUS   Oriented to person, place, and time.   Level of consciousness: drowsy  Knowledge: consistent with education.     CRANIAL NERVES   Cranial nerves II through XII intact.     MOTOR EXAM     Strength   Strength 5/5 throughout.     SENSORY EXAM        LUE and LLE paresthesias     GAIT AND COORDINATION     Gait  Gait: normal       Physical Exam  Vitals reviewed.   Constitutional:       Appearance: He is normal weight.   Neurological:      Mental Status: He is alert and oriented to person, place, and time. Mental status is at baseline.      Cranial Nerves: Cranial nerves 2-12 are intact.      Motor: Motor strength is normal.     Gait: Gait is intact.          Assessment:     Paresthesia of left arm and leg  -     Ambulatory referral/consult to Neurology         Primary Diagnosis and ICD10  Paresthesia of left arm and leg [R20.2]    Plan:     Patient Instructions   MRI cervical and lumbar r/o pathology  Gabapentin 300mg 2-3 x daily  Stop smoking   F/u 6 weeks     There are no discontinued medications.    Requested Prescriptions      No prescriptions requested or ordered in this encounter       "

## 2024-07-23 ENCOUNTER — TELEPHONE (OUTPATIENT)
Dept: UROLOGY | Facility: CLINIC | Age: 59
End: 2024-07-23

## 2024-07-23 NOTE — TELEPHONE ENCOUNTER
Patient had missed his CT scan and post op appointment for 7/18, renal CT scan rescheduled for Friday 7/26 at 8:30 with post op visit with Dr. Holliday afterwards.  Patient's line busy, son was called and notified of the changes, he verbalized understanding and states he will let the patient know.

## 2024-07-26 ENCOUNTER — OFFICE VISIT (OUTPATIENT)
Dept: UROLOGY | Facility: CLINIC | Age: 59
End: 2024-07-26

## 2024-07-26 ENCOUNTER — HOSPITAL ENCOUNTER (OUTPATIENT)
Dept: RADIOLOGY | Facility: HOSPITAL | Age: 59
Discharge: HOME | End: 2024-07-26
Attending: UROLOGY

## 2024-07-26 VITALS
BODY MASS INDEX: 24.92 KG/M2 | TEMPERATURE: 99 F | DIASTOLIC BLOOD PRESSURE: 73 MMHG | OXYGEN SATURATION: 97 % | HEART RATE: 56 BPM | WEIGHT: 178 LBS | HEIGHT: 71 IN | SYSTOLIC BLOOD PRESSURE: 176 MMHG

## 2024-07-26 DIAGNOSIS — D72.829 LEUKOCYTOSIS, UNSPECIFIED TYPE: ICD-10-CM

## 2024-07-26 DIAGNOSIS — N13.30 HYDROURETERONEPHROSIS: ICD-10-CM

## 2024-07-26 DIAGNOSIS — N39.0 URINARY TRACT INFECTION WITH HEMATURIA, SITE UNSPECIFIED: ICD-10-CM

## 2024-07-26 DIAGNOSIS — N20.1 LEFT URETERAL STONE: ICD-10-CM

## 2024-07-26 DIAGNOSIS — Z09 POSTOP CHECK: Primary | ICD-10-CM

## 2024-07-26 DIAGNOSIS — R31.9 URINARY TRACT INFECTION WITH HEMATURIA, SITE UNSPECIFIED: ICD-10-CM

## 2024-07-26 DIAGNOSIS — N20.0 KIDNEY STONES: ICD-10-CM

## 2024-07-26 PROCEDURE — 99999 PR PBB SHADOW E&M-EST. PATIENT-LVL V: CPT | Mod: PBBFAC,,, | Performed by: UROLOGY

## 2024-07-26 PROCEDURE — 87086 URINE CULTURE/COLONY COUNT: CPT | Mod: ,,, | Performed by: CLINICAL MEDICAL LABORATORY

## 2024-07-26 PROCEDURE — 99024 POSTOP FOLLOW-UP VISIT: CPT | Mod: ,,, | Performed by: UROLOGY

## 2024-07-26 PROCEDURE — 99215 OFFICE O/P EST HI 40 MIN: CPT | Mod: PBBFAC,25 | Performed by: UROLOGY

## 2024-07-26 PROCEDURE — 74176 CT ABD & PELVIS W/O CONTRAST: CPT | Mod: 26,,, | Performed by: RADIOLOGY

## 2024-07-26 PROCEDURE — 74176 CT ABD & PELVIS W/O CONTRAST: CPT | Mod: TC

## 2024-07-26 NOTE — PROGRESS NOTES
Subjective     Patient ID: Mickey Quiles is a 58 y.o. male.    Chief Complaint: Post-op Evaluation (CT scan @ 8:30 and post-op visit)        Patient seen last evening and was complaining of rather severe left flank pain.  Seen again this morning and still complaining of left flank pain but some of this may be related to his chronic back problem.  String is still intact and he has an indwelling left ureteral stent.  Patient can be discharged to remove his own to stent on Monday morning July 1.  We will arrange for him to be seen in clinic in about 2 weeks with a CT scan.  Discussed with Dr. Zuniga.    Electronically signed by Ovidio Holliday Jr., MD at 6/28/2024 11:39 AM  ---------------------------------------------------------------------------------------------------------------------------  The above is my progress note on Mr. Quiles after he had left ureteroscopy with laser lithotripsy of stone and stent insertion.  See discharge summary of Dr. Zuniga.     Patient had left ureteroscopy with laser lithotripsy of stone and stent insertion after be admitted through the emergency room.  He was supposed to remove his own stent on Monday July 1st but did not remove it and still has stent indwelling and still has string intact.  He was supposed to be seen for CT scan on July 18th but did not keep appointment.  Rescheduled for today with CT scan and he did come in and had that done.  CT scan reveals the left hydronephrosis is no longer present.  No visible ureteral stone.  Stent intact.  He still has stones in his left collecting system, but none that should currently cause any obstruction problems.  Stent removed by me.  It was intact.  [July 26, 2024]    Review of Systems       Objective     Physical Exam  Constitutional:       Appearance: He is normal weight. He is ill-appearing.      Comments: Appears older than chronological age   Neurological:      Mental Status: He is alert.   Psychiatric:         Mood  and Affect: Mood normal.         Behavior: Behavior normal.     Urinalysis had many bacteria with some pus cells and some red cells.  Dipstick had pH 5.0, specific gravity 1.015, 3+ leukocytes, nitrite positive, and hemolyzed trace of blood.     Assessment and Plan     1. Postop check    2. Left ureteral stone  -     Ambulatory referral/consult to Urology  -     X-Ray K[facility]; Future; Expected date: 09/26/2024    3. Hydroureteronephrosis  -     Ambulatory referral/consult to Urology    4. Leukocytosis, unspecified type  -     Ambulatory referral/consult to Urology    5. Urinary tract infection with hematuria, site unspecified  -     Urine culture; Future; Expected date: 07/26/2024      Stent removed as described.  Urine sent for culture.  I have given him 3 days of Cipro 500 mg b.i.d. while awaiting culture results.  He will have follow-up by Mr. Canales in a couple of months with KUB.        No follow-ups on file.

## 2024-07-26 NOTE — PATIENT INSTRUCTIONS
Stent removed as described.  Urine sent for culture.  I have given him 3 days of Cipro 500 mg b.i.d. while awaiting culture results.  He will have follow-up by Mr. Ellis in a couple of months with KUB.        September 26, 2024 at 9:20AM for Urology  1st floor to imaging center for KUB before office visit.    3rd floor to Chi Ellis NP

## 2024-07-27 LAB
UA COMPLETE W REFLEX CULTURE PNL UR: ABNORMAL
UA COMPLETE W REFLEX CULTURE PNL UR: ABNORMAL

## 2024-07-29 DIAGNOSIS — R31.9 URINARY TRACT INFECTION WITH HEMATURIA, SITE UNSPECIFIED: Primary | ICD-10-CM

## 2024-07-29 DIAGNOSIS — N39.0 URINARY TRACT INFECTION WITH HEMATURIA, SITE UNSPECIFIED: Primary | ICD-10-CM

## 2024-07-29 RX ORDER — NITROFURANTOIN 25; 75 MG/1; MG/1
100 CAPSULE ORAL 2 TIMES DAILY
Qty: 20 CAPSULE | Refills: 0 | Status: SHIPPED | OUTPATIENT
Start: 2024-07-29

## 2024-07-29 NOTE — TELEPHONE ENCOUNTER
Patient's urine culture came back showing >100,000 Escherichia coli; Dr. Holliday notified, order given for Macrobid 100 mg one po bid with food #20 with no refills.  Patient was called, no answer and unable to leave message, son was reached by phone and notified, he states he will let his dad know.  No known allergies and pharmacy of choice is [facility] at [place].

## 2024-08-13 ENCOUNTER — HOSPITAL ENCOUNTER (EMERGENCY)
Facility: HOSPITAL | Age: 59
Discharge: HOME | End: 2024-08-14

## 2024-08-13 DIAGNOSIS — N20.0 KIDNEY STONE ON LEFT SIDE: ICD-10-CM

## 2024-08-13 DIAGNOSIS — R10.9 BILATERAL FLANK PAIN: ICD-10-CM

## 2024-08-13 DIAGNOSIS — N30.00 ACUTE CYSTITIS WITHOUT HEMATURIA: Primary | ICD-10-CM

## 2024-08-13 LAB
ALBUMIN SERPL BCP-MCNC: 3.4 G/DL (ref 3.5–5)
ALBUMIN/GLOB SERPL: 0.9 {RATIO}
ALP SERPL-CCNC: 76 U/L (ref 45–115)
ALT SERPL W P-5'-P-CCNC: 21 U/L (ref 16–61)
ANION GAP SERPL CALCULATED.3IONS-SCNC: 8 MMOL/L (ref 7–16)
AST SERPL W P-5'-P-CCNC: 19 U/L (ref 15–37)
BACTERIA #/AREA URNS HPF: ABNORMAL /HPF
BASOPHILS # BLD AUTO: 0.03 K/UL (ref 0–0.2)
BASOPHILS NFR BLD AUTO: 0.6 % (ref 0–1)
BILIRUB SERPL-MCNC: 0.3 MG/DL (ref ?–1.2)
BILIRUB UR QL STRIP: NEGATIVE
BUN SERPL-MCNC: 9 MG/DL (ref 7–18)
BUN/CREAT SERPL: 8 (ref 6–20)
CALCIUM SERPL-MCNC: 9 MG/DL (ref 8.5–10.1)
CHLORIDE SERPL-SCNC: 107 MMOL/L (ref 98–107)
CLARITY UR: ABNORMAL
CO2 SERPL-SCNC: 27 MMOL/L (ref 21–32)
COLOR UR: YELLOW
CREAT SERPL-MCNC: 1.16 MG/DL (ref 0.7–1.3)
DIFFERENTIAL METHOD BLD: ABNORMAL
EGFR (NO RACE VARIABLE) (RUSH/TITUS): 73 ML/MIN/1.73M2
EOSINOPHIL # BLD AUTO: 0.01 K/UL (ref 0–0.5)
EOSINOPHIL NFR BLD AUTO: 0.2 % (ref 1–4)
ERYTHROCYTE [DISTWIDTH] IN BLOOD BY AUTOMATED COUNT: 14.6 % (ref 11.5–14.5)
GLOBULIN SER-MCNC: 3.6 G/DL (ref 2–4)
GLUCOSE SERPL-MCNC: 81 MG/DL (ref 74–106)
GLUCOSE UR STRIP-MCNC: NORMAL MG/DL
HCT VFR BLD AUTO: 42.9 % (ref 40–54)
HGB BLD-MCNC: 13.3 G/DL (ref 13.5–18)
HYALINE CASTS #/AREA URNS LPF: ABNORMAL /LPF
IMM GRANULOCYTES # BLD AUTO: 0.02 K/UL (ref 0–0.04)
IMM GRANULOCYTES NFR BLD: 0.4 % (ref 0–0.4)
KETONES UR STRIP-SCNC: NEGATIVE MG/DL
LEUKOCYTE ESTERASE UR QL STRIP: ABNORMAL
LYMPHOCYTES # BLD AUTO: 1.04 K/UL (ref 1–4.8)
LYMPHOCYTES NFR BLD AUTO: 20.6 % (ref 27–41)
MCH RBC QN AUTO: 28.4 PG (ref 27–31)
MCHC RBC AUTO-ENTMCNC: 31 G/DL (ref 32–36)
MCV RBC AUTO: 91.5 FL (ref 80–96)
MONOCYTES # BLD AUTO: 1.07 K/UL (ref 0–0.8)
MONOCYTES NFR BLD AUTO: 21.2 % (ref 2–6)
MPC BLD CALC-MCNC: 10.5 FL (ref 9.4–12.4)
MUCOUS, UA: ABNORMAL /LPF
NEUTROPHILS # BLD AUTO: 2.87 K/UL (ref 1.8–7.7)
NEUTROPHILS NFR BLD AUTO: 57 % (ref 53–65)
NITRITE UR QL STRIP: POSITIVE
NRBC # BLD AUTO: 0 X10E3/UL
NRBC, AUTO (.00): 0 %
PH UR STRIP: 6 PH UNITS
PLATELET # BLD AUTO: 150 K/UL (ref 150–400)
POTASSIUM SERPL-SCNC: 3.7 MMOL/L (ref 3.5–5.1)
PROT SERPL-MCNC: 7 G/DL (ref 6.4–8.2)
PROT UR QL STRIP: 30
RBC # BLD AUTO: 4.69 M/UL (ref 4.6–6.2)
RBC # UR STRIP: ABNORMAL /UL
RBC #/AREA URNS HPF: 3 /HPF
SODIUM SERPL-SCNC: 138 MMOL/L (ref 136–145)
SP GR UR STRIP: 1.02
SQUAMOUS #/AREA URNS LPF: ABNORMAL /HPF
UROBILINOGEN UR STRIP-ACNC: NORMAL MG/DL
WBC # BLD AUTO: 5.04 K/UL (ref 4.5–11)
WBC #/AREA URNS HPF: 113 /HPF

## 2024-08-13 PROCEDURE — 25000003 PHARM REV CODE 250: Performed by: NURSE PRACTITIONER

## 2024-08-13 PROCEDURE — 87086 URINE CULTURE/COLONY COUNT: CPT | Performed by: NURSE PRACTITIONER

## 2024-08-13 PROCEDURE — 96375 TX/PRO/DX INJ NEW DRUG ADDON: CPT

## 2024-08-13 PROCEDURE — 99285 EMERGENCY DEPT VISIT HI MDM: CPT | Mod: 25

## 2024-08-13 PROCEDURE — 87186 SC STD MICRODIL/AGAR DIL: CPT | Performed by: NURSE PRACTITIONER

## 2024-08-13 PROCEDURE — 96361 HYDRATE IV INFUSION ADD-ON: CPT

## 2024-08-13 PROCEDURE — 63600175 PHARM REV CODE 636 W HCPCS: Performed by: NURSE PRACTITIONER

## 2024-08-13 PROCEDURE — 85025 COMPLETE CBC W/AUTO DIFF WBC: CPT | Performed by: NURSE PRACTITIONER

## 2024-08-13 PROCEDURE — 96365 THER/PROPH/DIAG IV INF INIT: CPT

## 2024-08-13 PROCEDURE — 81001 URINALYSIS AUTO W/SCOPE: CPT | Performed by: NURSE PRACTITIONER

## 2024-08-13 PROCEDURE — 80053 COMPREHEN METABOLIC PANEL: CPT | Performed by: NURSE PRACTITIONER

## 2024-08-13 RX ORDER — SULFAMETHOXAZOLE AND TRIMETHOPRIM 800; 160 MG/1; MG/1
1 TABLET ORAL 2 TIMES DAILY
Qty: 20 TABLET | Refills: 0 | Status: SHIPPED | OUTPATIENT
Start: 2024-08-13 | End: 2024-08-23

## 2024-08-13 RX ORDER — HYDROMORPHONE HYDROCHLORIDE 2 MG/ML
1 INJECTION, SOLUTION INTRAMUSCULAR; INTRAVENOUS; SUBCUTANEOUS
Status: DISCONTINUED | OUTPATIENT
Start: 2024-08-13 | End: 2024-08-14

## 2024-08-13 RX ORDER — KETOROLAC TROMETHAMINE 30 MG/ML
30 INJECTION, SOLUTION INTRAMUSCULAR; INTRAVENOUS
Status: COMPLETED | OUTPATIENT
Start: 2024-08-13 | End: 2024-08-13

## 2024-08-13 RX ORDER — HYDROMORPHONE HYDROCHLORIDE 2 MG/ML
1 INJECTION, SOLUTION INTRAMUSCULAR; INTRAVENOUS; SUBCUTANEOUS
Status: COMPLETED | OUTPATIENT
Start: 2024-08-13 | End: 2024-08-13

## 2024-08-13 RX ORDER — ONDANSETRON HYDROCHLORIDE 2 MG/ML
4 INJECTION, SOLUTION INTRAVENOUS
Status: COMPLETED | OUTPATIENT
Start: 2024-08-13 | End: 2024-08-13

## 2024-08-13 RX ADMIN — CEFTRIAXONE SODIUM 1 G: 1 INJECTION, POWDER, FOR SOLUTION INTRAMUSCULAR; INTRAVENOUS at 10:08

## 2024-08-13 RX ADMIN — KETOROLAC TROMETHAMINE 30 MG: 30 INJECTION, SOLUTION INTRAMUSCULAR at 11:08

## 2024-08-13 RX ADMIN — SODIUM CHLORIDE 500 ML: 9 INJECTION, SOLUTION INTRAVENOUS at 09:08

## 2024-08-13 RX ADMIN — ONDANSETRON 4 MG: 2 INJECTION INTRAMUSCULAR; INTRAVENOUS at 09:08

## 2024-08-13 RX ADMIN — HYDROMORPHONE HYDROCHLORIDE 1 MG: 2 INJECTION, SOLUTION INTRAMUSCULAR; INTRAVENOUS; SUBCUTANEOUS at 09:08

## 2024-08-14 VITALS
HEART RATE: 44 BPM | OXYGEN SATURATION: 95 % | WEIGHT: 174 LBS | DIASTOLIC BLOOD PRESSURE: 46 MMHG | HEIGHT: 71 IN | RESPIRATION RATE: 16 BRPM | TEMPERATURE: 98 F | SYSTOLIC BLOOD PRESSURE: 115 MMHG | BODY MASS INDEX: 24.36 KG/M2

## 2024-08-14 PROCEDURE — 25000003 PHARM REV CODE 250: Performed by: NURSE PRACTITIONER

## 2024-08-14 RX ORDER — HYDROCODONE BITARTRATE AND ACETAMINOPHEN 10; 325 MG/1; MG/1
1 TABLET ORAL
Status: COMPLETED | OUTPATIENT
Start: 2024-08-14 | End: 2024-08-14

## 2024-08-14 RX ADMIN — HYDROCODONE BITARTRATE AND ACETAMINOPHEN 1 TABLET: 10; 325 TABLET ORAL at 12:08

## 2024-08-14 NOTE — ED PROVIDER NOTES
Encounter Date: 8/13/2024       History     Chief Complaint   Patient presents with    Back Pain    Groin Pain     Patient is a 58-year-old white male who presents to the emergency department with complaint of bilateral flank pain, worse on the right, radiating into the right lower quadrant.  Patient has an extensive history of kidney stones that has previously required lithotripsy and ureteral stents.  He reports he has been hurting for a few days and the pain is getting worse.      Review of patient's allergies indicates:  No Known Allergies  Past Medical History:   Diagnosis Date    Left ureteral stone     9 mm     Past Surgical History:   Procedure Laterality Date    CYSTOURETEROSCOPY, WITH HOLMIUM LASER LITHOTRIPSY OF URETERAL CALCULUS AND STENT INSERTION Left 6/27/2024    Procedure: CYSTOURETEROSCOPY WITH HOLMIUM LASER LITHOTRIPSY OF URETERAL CALCULUS AND STENT INSERTION;  Surgeon: Ovidio Holliday Jr., MD;  Location: [facility];  Service: Urology;  Laterality: Left;    NECK SURGERY       Family History   Problem Relation Name Age of Onset    Cancer Father       Social History     Tobacco Use    Smoking status: Every Day     Current packs/day: 2.00     Average packs/day: 2.0 packs/day for 35.1 years (70.3 ttl pk-yrs)     Types: Cigarettes     Start date: 6/26/1989    Smokeless tobacco: Never   Substance Use Topics    Alcohol use: Never    Drug use: Not Currently     Types: Marijuana     Review of Systems   Constitutional:  Negative for chills, diaphoresis, fatigue and fever.   Respiratory:  Negative for shortness of breath.    Cardiovascular:  Negative for chest pain.   Gastrointestinal:  Positive for abdominal pain.   Genitourinary:  Positive for difficulty urinating, dysuria and flank pain. Negative for enuresis, frequency, hematuria and urgency.   Musculoskeletal:  Positive for back pain.       Physical Exam     Initial Vitals [08/13/24 2055]   BP Pulse Resp Temp SpO2   (!) 161/74 65 18 99.2 °F (37.3 °C)  97 %      MAP       --         Physical Exam    Constitutional: He appears well-developed and well-nourished.   HENT:   Head: Normocephalic.   Eyes: Pupils are equal, round, and reactive to light.   Neck: Neck supple.   Cardiovascular:  Normal rate.           Pulmonary/Chest: Breath sounds normal. No respiratory distress.   Abdominal: Abdomen is soft. He exhibits no distension. There is no abdominal tenderness. There is no rebound and no guarding.   Musculoskeletal:      Cervical back: Neck supple.     Neurological: He is alert and oriented to person, place, and time. GCS score is 15. GCS eye subscore is 4. GCS verbal subscore is 5. GCS motor subscore is 6.   Skin: Skin is warm and dry.   Psychiatric: He has a normal mood and affect. His behavior is normal. Judgment and thought content normal.         Medical Screening Exam   See Full Note    ED Course   Procedures  Labs Reviewed   URINALYSIS, REFLEX TO URINE CULTURE - Abnormal       Result Value    Color, UA Yellow      Clarity, UA Turbid      pH, UA 6.0      Leukocytes, UA Large (*)     Nitrites, UA Positive (*)     Protein, UA 30 (*)     Glucose, UA Normal      Ketones, UA Negative      Urobilinogen, UA Normal      Bilirubin, UA Negative      Blood, UA Trace (*)     Specific [place], UA 1.021     COMPREHENSIVE METABOLIC PANEL - Abnormal    Sodium 138      Potassium 3.7      Chloride 107      CO2 27      Anion Gap 8      Glucose 81      BUN 9      Creatinine 1.16      BUN/Creatinine Ratio 8      Calcium 9.0      Total Protein 7.0      Albumin 3.4 (*)     Globulin 3.6      A/G Ratio 0.9      Bilirubin, Total 0.3      Alk Phos 76      ALT 21      AST 19      eGFR 73     CBC WITH DIFFERENTIAL - Abnormal    WBC 5.04      RBC 4.69      Hemoglobin 13.3 (*)     Hematocrit 42.9      MCV 91.5      MCH 28.4      MCHC 31.0 (*)     RDW 14.6 (*)     Platelet Count 150      MPV 10.5      Neutrophils % 57.0      Lymphocytes % 20.6 (*)     Monocytes % 21.2 (*)     Eosinophils %  0.2 (*)     Basophils % 0.6      Immature Granulocytes % 0.4      nRBC, Auto 0.0      Neutrophils, Abs 2.87      Lymphocytes, Absolute 1.04      Monocytes, Absolute 1.07 (*)     Eosinophils, Absolute 0.01      Basophils, Absolute 0.03      Immature Granulocytes, Absolute 0.02      nRBC, Absolute 0.00      Diff Type Auto     URINALYSIS, MICROSCOPIC - Abnormal    WBC,  (*)     RBC, UA 3      Bacteria, UA Many (*)     Squamous Epithelial Cells, UA Few (*)     Hyaline Casts, UA 0-2 (*)     Mucous Few (*)    CULTURE, URINE   CBC W/ AUTO DIFFERENTIAL    Narrative:     The following orders were created for panel order CBC auto differential.  Procedure                               Abnormality         Status                     ---------                               -----------         ------                     CBC with Differential[[id number]]       Abnormal            Final result                 Please view results for these tests on the individual orders.          Imaging Results              CT Renal Stone Study Abd Pelvis WO (In process)                      Medications   HYDROmorphone (PF) injection 1 mg (has no administration in time range)   HYDROmorphone (PF) injection 1 mg (1 mg Intravenous Given 24)   sodium chloride 0.9% bolus 500 mL 500 mL (0 mLs Intravenous Stopped 24)   ondansetron injection 4 mg (4 mg Intravenous Given 24)   cefTRIAXone (Rocephin) 1 g in D5W 100 mL IVPB (MB+) (0 g Intravenous Stopped 24)   ketorolac injection 30 mg (30 mg Intravenous Given 248)     Medical Decision Making  Medical decision makin-year-old male with bilateral flank pain, worse on right, history of kidney stones.  We will get lab, urine, CT renal stone protocol, provide pain control and reassess.    Pain controlled.  VRad report showing left lower pole nonobstructing 7 mm stone.  No ureteral stone noted.  Creatinine is normal.  We will treat for UTI with Bactrim as  patient noted to have just completed Cipro at the end of June per med list.    Amount and/or Complexity of Data Reviewed  Labs: ordered.  Radiology: ordered.    Risk  Prescription drug management.                                      Clinical Impression:   Final diagnoses:  [N30.00] Acute cystitis without hematuria (Primary)  [N20.0] Kidney stone on left side  [R10.9] Bilateral flank pain        ED Disposition Condition    Discharge Stable          ED Prescriptions       Medication Sig Dispense Start Date End Date Auth. Provider    sulfamethoxazole-trimethoprim 800-160mg (BACTRIM DS) 800-160 mg Tab Take 1 tablet by mouth 2 (two) times daily. for 10 days 20 tablet 8/13/2024 8/23/2024 Henna Ferrer, JOSH          Follow-up Information    None          Henna Ferrer, JOSH  08/13/24 [id number]       Henna Ferrer, JOSH  08/13/24 [id number]

## 2024-08-14 NOTE — DISCHARGE INSTRUCTIONS
Drink plenty of fluids to stay hydrated.  Take antibiotics as directed until gone.  Follow-up with your urologist or primary care provider next week.  Return to the emergency department for new or worsening symptoms.

## 2024-08-16 LAB — UA COMPLETE W REFLEX CULTURE PNL UR: ABNORMAL

## 2024-08-17 RX ORDER — NITROFURANTOIN 25; 75 MG/1; MG/1
100 CAPSULE ORAL 2 TIMES DAILY
Qty: 20 CAPSULE | Refills: 0 | Status: SHIPPED | OUTPATIENT
Start: 2024-08-17

## 2024-08-19 ENCOUNTER — TELEPHONE (OUTPATIENT)
Dept: EMERGENCY MEDICINE | Facility: HOSPITAL | Age: 59
End: 2024-08-19

## 2024-08-19 NOTE — TELEPHONE ENCOUNTER
His culture is resistant to the bactrim that he was prescribed.  It is sensitive to the macrobid which he has been on in the past.  I sent in order for macrobid, need to stop the bactrim.   ----- Message from Bea Corrigan RN sent at 8/18/2024  5:53 PM CDT -----  Attempted to to contact patient x 2 - no answer.

## 2024-08-19 NOTE — TELEPHONE ENCOUNTER
----- Message from Bea Corrigan RN sent at 8/18/2024  5:53 PM CDT -----  Attempted to to contact patient x 2 - no answer.

## 2024-09-30 PROBLEM — N10 ACUTE PYELONEPHRITIS: Status: RESOLVED | Noted: 2024-06-26 | Resolved: 2024-09-30

## 2024-10-23 ENCOUNTER — PATIENT MESSAGE (OUTPATIENT)
Dept: RESEARCH | Facility: HOSPITAL | Age: 59
End: 2024-10-23

## 2024-10-24 ENCOUNTER — PATIENT MESSAGE (OUTPATIENT)
Dept: RESEARCH | Facility: HOSPITAL | Age: 59
End: 2024-10-24

## 2024-12-11 ENCOUNTER — HOSPITAL ENCOUNTER (EMERGENCY)
Facility: HOSPITAL | Age: 59
Discharge: HOME | End: 2024-12-11

## 2024-12-11 VITALS
BODY MASS INDEX: 24.36 KG/M2 | TEMPERATURE: 98 F | DIASTOLIC BLOOD PRESSURE: 83 MMHG | WEIGHT: 174 LBS | SYSTOLIC BLOOD PRESSURE: 169 MMHG | HEIGHT: 71 IN | HEART RATE: 54 BPM | OXYGEN SATURATION: 99 % | RESPIRATION RATE: 18 BRPM

## 2024-12-11 DIAGNOSIS — M51.379 DEGENERATIVE DISC DISEASE AT L5-S1 LEVEL: Primary | ICD-10-CM

## 2024-12-11 PROCEDURE — 96372 THER/PROPH/DIAG INJ SC/IM: CPT

## 2024-12-11 PROCEDURE — 99284 EMERGENCY DEPT VISIT MOD MDM: CPT | Mod: 25

## 2024-12-11 PROCEDURE — 63600175 PHARM REV CODE 636 W HCPCS

## 2024-12-11 RX ORDER — METHYLPREDNISOLONE SOD SUCC 125 MG
125 VIAL (EA) INJECTION
Status: COMPLETED | OUTPATIENT
Start: 2024-12-11 | End: 2024-12-11

## 2024-12-11 RX ORDER — KETOROLAC TROMETHAMINE 30 MG/ML
30 INJECTION, SOLUTION INTRAMUSCULAR; INTRAVENOUS
Status: COMPLETED | OUTPATIENT
Start: 2024-12-11 | End: 2024-12-11

## 2024-12-11 RX ORDER — HYDROCODONE BITARTRATE AND ACETAMINOPHEN 7.5; 325 MG/1; MG/1
1 TABLET ORAL EVERY 6 HOURS PRN
Qty: 18 TABLET | Refills: 0 | Status: SHIPPED | OUTPATIENT
Start: 2024-12-11

## 2024-12-11 RX ADMIN — KETOROLAC TROMETHAMINE 30 MG: 30 INJECTION, SOLUTION INTRAMUSCULAR at 05:12

## 2024-12-11 RX ADMIN — METHYLPREDNISOLONE SODIUM SUCCINATE 125 MG: 125 INJECTION, POWDER, FOR SOLUTION INTRAMUSCULAR; INTRAVENOUS at 05:12

## 2024-12-12 NOTE — ED PROVIDER NOTES
Encounter Date: 12/11/2024       History     Chief Complaint   Patient presents with    Hip Pain     59-year old male presents to the emergency department for evaluation of lower back, left hip and left leg pain that has been going on for the past several years. Reports that pain has gotten acutely worse over the past few weeks, which caused him to seek treatment. Further states that he had a back injury that occurred several years ago and refused back surgery, stating that he followed up with pain treatment for injections, but does not currently have any management. Denies numbness/tingling/inability to bear to bilateral lower extremities.    The history is provided by the patient. No  was used.   Back Pain   This is a chronic problem. The current episode started several weeks ago. The problem occurs constantly. The problem has been unchanged. Associated with: From old injury several years ago. The pain is present in the lumbar spine. The quality of the pain is described as aching. The pain radiates to the left leg. The pain is at a severity of 8/10. The symptoms are aggravated by certain positions. Associated symptoms include leg pain. Pertinent negatives include no chest pain, no fever, no headaches, no abdominal pain, no dysuria, no tingling and no weakness. He has tried NSAIDs, ice and muscle relaxants for the symptoms. The treatment provided mild relief.     Review of patient's allergies indicates:  No Known Allergies  Past Medical History:   Diagnosis Date    Left ureteral stone     9 mm     Past Surgical History:   Procedure Laterality Date    CYSTOURETEROSCOPY, WITH HOLMIUM LASER LITHOTRIPSY OF URETERAL CALCULUS AND STENT INSERTION Left 6/27/2024    Procedure: CYSTOURETEROSCOPY WITH HOLMIUM LASER LITHOTRIPSY OF URETERAL CALCULUS AND STENT INSERTION;  Surgeon: Ovidio Holliday Jr., MD;  Location: [facility];  Service: Urology;  Laterality: Left;    NECK SURGERY       Family History    Problem Relation Name Age of Onset    Cancer Father       Social History     Tobacco Use    Smoking status: Every Day     Current packs/day: 2.00     Average packs/day: 2.0 packs/day for 35.5 years (70.9 ttl pk-yrs)     Types: Cigarettes     Start date: 6/26/1989    Smokeless tobacco: Never   Substance Use Topics    Alcohol use: Never    Drug use: Not Currently     Types: Marijuana     Review of Systems   Constitutional:  Negative for chills and fever.   Eyes:  Negative for photophobia, pain and visual disturbance.   Respiratory:  Negative for chest tightness and shortness of breath.    Cardiovascular:  Negative for chest pain.   Gastrointestinal:  Negative for abdominal pain.   Genitourinary:  Negative for decreased urine volume, dysuria and hematuria.   Musculoskeletal:  Positive for back pain. Negative for neck pain and neck stiffness.        Report left hip and left leg pain     Neurological:  Negative for dizziness, tingling, tremors, weakness and headaches.   Psychiatric/Behavioral:  Negative for confusion.    All other systems reviewed and are negative.      Physical Exam     Initial Vitals [12/11/24 1607]   BP Pulse Resp Temp SpO2   (!) 169/83 (!) 54 18 97.9 °F (36.6 °C) 99 %      MAP       --         Physical Exam    Vitals reviewed.  Constitutional: He appears well-nourished. No distress.   HENT:   Head: Normocephalic.   Eyes: Conjunctivae are normal. Right eye exhibits no discharge. Left eye exhibits no discharge.   Neck: Neck supple.   Normal range of motion.  Cardiovascular:  Normal rate, regular rhythm and normal heart sounds.           Pulmonary/Chest: Breath sounds normal. No respiratory distress. He has no wheezes. He has no rhonchi. He exhibits no tenderness.   Abdominal: Abdomen is soft. Bowel sounds are normal. He exhibits no distension. There is no abdominal tenderness. There is no guarding.   Musculoskeletal:         General: Normal range of motion.      Cervical back: Normal, normal range of  motion and neck supple.      Thoracic back: Normal.      Lumbar back: No swelling, deformity, signs of trauma, lacerations, tenderness or bony tenderness. Normal range of motion.     Lymphadenopathy:     He has no cervical adenopathy.   Neurological: He is alert and oriented to person, place, and time. He has normal strength. No sensory deficit. GCS score is 15. GCS eye subscore is 4. GCS verbal subscore is 5. GCS motor subscore is 6.   Skin: Skin is warm and dry. Capillary refill takes less than 2 seconds.   Psychiatric: He has a normal mood and affect. His behavior is normal.         Medical Screening Exam   See Full Note    ED Course   Procedures  Labs Reviewed - No data to display       Imaging Results              X-Ray Lumbar Spine Ap And Lateral (Final result)  Result time 12/11/24 17:51:23      Final result by Chet Davis MD (12/11/24 17:51:23)                   Impression:      1. No acute abnormality.  2. Multilevel chronic degenerative changes with levoscoliosis of the lumbar spine at the L2-3 level, minimally increased from the prior study.      Electronically signed by: Chet Davis  Date:    12/11/2024  Time:    17:51               Narrative:    EXAMINATION:  XR LUMBAR SPINE AP AND LATERAL    CLINICAL HISTORY:  back pain;    TECHNIQUE:  AP, lateral and spot images were performed of the lumbar spine.    COMPARISON:  11/17/2016    FINDINGS:  Levoscoliosis of the mid lumbar spine at L2-3 is minimally increased from the prior study.    Severe disc space narrowing at L2-3 on the right with associated endplate degenerative changes.    Grade 1 retrolisthesis of L3 on L4.    Osteophytic spurring of the endplates.                                       X-Ray Hip 2 or 3 views Left with Pelvis when performed (Final result)  Result time 12/11/24 17:49:07      Final result by Chet Davis MD (12/11/24 17:49:07)                   Impression:      No acute radiographic abnormality.      Electronically  signed by: Chet Roberts  Date:    12/11/2024  Time:    17:49               Narrative:    EXAMINATION:  XR HIP WITH PELVIS WHEN PERFORMED 2 OR 3 VIEWS LEFT    CLINICAL HISTORY:  left hip pain;    TECHNIQUE:  AP view of the pelvis and frog leg lateral view of the left hip were performed.    COMPARISON:  11/17/2016    FINDINGS:  The femoral heads appear normally positioned.  No acute fracture, subluxation or dislocation.  Mild degenerative changes of the hips bilaterally.  The pubic rami and symphysis are intact.  The SI joints are intact.  No osseous destruction.                                       Medications   methylPREDNISolone sodium succinate injection 125 mg (125 mg Intramuscular Given 12/11/24 1706)   ketorolac injection 30 mg (30 mg Intramuscular Given 12/11/24 1705)     Medical Decision Making  59-year old male presents to the emergency department for evaluation of lower back, left hip and left leg pain that has been going on for the past several years. Reports that pain has gotten acutely worse over the past few weeks, which caused him to seek treatment. Further states that he had a back injury that occurred several years ago and refused back surgery, stating that he followed up with pain treatment for injections, but does not currently have any management. Denies numbness/tingling/inability to bear to bilateral lower extremities.  Ordered and reviewed left hip xray as well as radiologist's interpretation with results significant for no acute radiographic abnormality.  Ordered and reviewed xray lumbar spine as well as radiologist's interpretation with results significant for 1. No acute abnormality.  2. Multilevel chronic degenerative changes with levoscoliosis of the lumbar spine at the L2-3 level, minimally increased from the prior study.  Toradol and solu-medrol IM ordered in ED  Follow-up and return precautions discussed with patient, who verbalized understanding  Ambulatory referral given to ortho  spine for discharge  Diagnosis: Degenerative disc disease at L5-S1 level      Amount and/or Complexity of Data Reviewed  Radiology: ordered.    Risk  Prescription drug management.                                      Clinical Impression:   Final diagnoses:  [M51.379] Degenerative disc disease at L5-S1 level (Primary)        ED Disposition Condition    Discharge Stable          ED Prescriptions       Medication Sig Dispense Start Date End Date Auth. Provider    HYDROcodone-acetaminophen (NORCO) 7.5-325 mg per tablet Take 1 tablet by mouth every 6 (six) hours as needed for Pain. 18 tablet 12/11/2024 -- Santhosh Pardo, NP          Follow-up Information    None          Santhosh Pardo, NP  12/11/24 [id number]

## 2024-12-12 NOTE — DISCHARGE INSTRUCTIONS
Follow up with ortho/spine, they will call you with an appointment date/time. Take medication as ordered. Return to the ED for new or worsening symptoms.

## 2024-12-16 ENCOUNTER — HOSPITAL ENCOUNTER (OUTPATIENT)
Dept: RADIOLOGY | Facility: HOSPITAL | Age: 59
Discharge: HOME | End: 2024-12-16
Attending: ORTHOPAEDIC SURGERY

## 2024-12-16 ENCOUNTER — OFFICE VISIT (OUTPATIENT)
Dept: SPINE | Facility: CLINIC | Age: 59
End: 2024-12-16

## 2024-12-16 VITALS — HEIGHT: 71 IN | WEIGHT: 180 LBS | BODY MASS INDEX: 25.2 KG/M2

## 2024-12-16 DIAGNOSIS — M51.379 DEGENERATIVE DISC DISEASE AT L5-S1 LEVEL: ICD-10-CM

## 2024-12-16 DIAGNOSIS — M54.16 LUMBAR RADICULOPATHY, CHRONIC: ICD-10-CM

## 2024-12-16 DIAGNOSIS — M54.16 LUMBAR RADICULOPATHY: Primary | ICD-10-CM

## 2024-12-16 DIAGNOSIS — M41.9 SCOLIOSIS OF THORACOLUMBAR SPINE, UNSPECIFIED SCOLIOSIS TYPE: ICD-10-CM

## 2024-12-16 DIAGNOSIS — R09.89 DECREASED PEDAL PULSES: ICD-10-CM

## 2024-12-16 DIAGNOSIS — M54.16 LUMBAR RADICULOPATHY: ICD-10-CM

## 2024-12-16 DIAGNOSIS — M79.605 LEFT LEG PAIN: Primary | ICD-10-CM

## 2024-12-16 PROCEDURE — 99213 OFFICE O/P EST LOW 20 MIN: CPT | Mod: PBBFAC,25 | Performed by: ORTHOPAEDIC SURGERY

## 2024-12-16 PROCEDURE — 99204 OFFICE O/P NEW MOD 45 MIN: CPT | Mod: 25,S$PBB,, | Performed by: ORTHOPAEDIC SURGERY

## 2024-12-16 PROCEDURE — 99999 PR PBB SHADOW E&M-EST. PATIENT-LVL III: CPT | Mod: PBBFAC,,, | Performed by: ORTHOPAEDIC SURGERY

## 2024-12-16 PROCEDURE — 72110 X-RAY EXAM L-2 SPINE 4/>VWS: CPT | Mod: 26,,, | Performed by: ORTHOPAEDIC SURGERY

## 2024-12-16 PROCEDURE — 72110 X-RAY EXAM L-2 SPINE 4/>VWS: CPT | Mod: TC

## 2024-12-16 RX ORDER — GABAPENTIN 600 MG/1
600 TABLET ORAL 3 TIMES DAILY
Qty: 90 TABLET | Refills: 11 | Status: SHIPPED | OUTPATIENT
Start: 2024-12-16 | End: 2025-12-16

## 2024-12-16 NOTE — PROGRESS NOTES
Smoking Cessation counseling    Time spent:  3-10 minutes (24049)    We discussed the importance, over both the short and long-term, of smoking cessation; reviewed the patient's willingness to quit; overall history and current use of tobacco smoking products; that there are a variety of methods to help with smoking diminution and cessation (stopping alone, using nicotine substitution medications/gums, Chantix, other medications, etcetera, which the patient will also discuss with his or her primary care physician); that the patient should set a date for smoking cessation; and the patient will follow up with his or her primary care physician for further support and oversight.    We also discussed that for the patient to have surgery while using nicotine, wound healing may be compromised relative to those who do not smoke; that recovery from anesthesia may sometimes be more difficult; and that quitting may decrease the heart, vascular, pulmonary effects in the short term as well as over the long term.  Smoking cessation may be useful and important for any patient who will have a fusion as part of surgery or have bone or tissue that has regrown heal (bone fusions, wound closures, etc.)  since surgical results with respect to wound healing, susceptibility to recovery from infection, bone fusion, and tissue and blood vessel health may be impaired or less optimal in smokers than nonsmokers.  We talked about the elevated risk of surgical bone fusion failure in the setting of smoking and the potential need for a higher-risk revision surgery should fusion not occur.    I reviewed this with the patient in detail and all questions were answered.  We discussed nature of the patient's condition; real alternatives and realistic options; pros and cons, risks and benefits of all the options, in detail.  I believe the patient understands the above and wishes to proceed as outlined.

## 2024-12-16 NOTE — PROGRESS NOTES
MDM/time:  45-50 minutes spent on this encounter including 15 minutes reviewing imaging and notes, 20 minutes with the patient, 10 minutes documentation    ASSESSMENT:  59 y.o. male with thoracolumbar scoliosis with lumbar spondylosis radiculopathy and decreased pedal pulses noted to the left lower extremity    PLAN:  Patient needs to sign of her patient assistance he has had patient's assistance in the past  ABIs  MRI lumbar spine  Increase Neurontin to 600 mg 1 tablet 3 times a day  Follow-up after MRI    HPI:  59 y.o. male here for evaluation of lower back pain that radiates into the left hip down the left leg.  Patient reports he has had a history of back pain and left hip pain for the past year but patient reports over the past month and a half he has had increased pain after slipping and falling.  He describes pain as a tight burning sensation in his left thigh.  No issues with  strength.  Balance issues with falls as discussed above.  Bladder incontinence but no bowel issues.  Unable to stand or walk due to pain.  Currently taking gabapentin 300 mg 1 tablet 3 times a day.  No recent physical therapy.  No recent epidural injections he has had injections many years ago but nothing recently.  No recent MRI.  No prior spine surgery.  Patient currently smokes a pack and half of cigarettes per day.    IMAGING:  AP, lateral, flexion/extension views of the lumbar spine reviewed     On the AP there is thoracolumbar curvature of the right and lumbar curvature to the left.  There are 5 non-rib-bearing lumbar vertebrae.  On the lateral there is decreased lumbar lordosis.  There is spondylotic disease with decreased disc height and osteophyte formation noted.  No fractures or listhesis noted.  No instability on flexion-extension views.     Impression:  Spondylotic changes of the lumbar spine as noted above    Past Medical History:   Diagnosis Date    Left ureteral stone     9 mm     Past Surgical History:    Procedure Laterality Date    CYSTOURETEROSCOPY, WITH HOLMIUM LASER LITHOTRIPSY OF URETERAL CALCULUS AND STENT INSERTION Left 6/27/2024    Procedure: CYSTOURETEROSCOPY WITH HOLMIUM LASER LITHOTRIPSY OF URETERAL CALCULUS AND STENT INSERTION;  Surgeon: Ovidio Holliday Jr., MD;  Location: [facility];  Service: Urology;  Laterality: Left;    NECK SURGERY       Social History     Tobacco Use    Smoking status: Every Day     Current packs/day: 2.00     Average packs/day: 2.0 packs/day for 35.5 years (70.9 ttl pk-yrs)     Types: Cigarettes     Start date: 6/26/1989    Smokeless tobacco: Never   Substance Use Topics    Alcohol use: Never    Drug use: Not Currently     Types: Marijuana      Current Outpatient Medications   Medication Instructions    ciprofloxacin HCl (CIPRO) 500 mg, Oral, Every 12 hours    gabapentin (NEURONTIN) 300 mg, Oral, 3 times daily    HYDROcodone-acetaminophen (NORCO) 7.5-325 mg per tablet 1 tablet, Oral, Every 6 hours PRN    nitrofurantoin, macrocrystal-monohydrate, (MACROBID) 100 MG capsule 100 mg, Oral, 2 times daily    promethazine (PHENERGAN) 25 mg, Oral, Every 6 hours PRN    tamsulosin (FLOMAX) 0.4 mg, Oral, Daily    tetracycline (ACHROMYCIN,SUMYCIN) 500 mg, Oral, 2 times daily        EXAM:  Constitutional  General Appearance:  Body mass index is 25.1 kg/m²., NAD  Psychiatric   Orientation: Oriented to time, oriented to place, oriented to person  Mood and Affect: Active and alert, normal mood, normal affect  Gait and Station   Appearance:  Antalgic gait to the left, unable to tandem gait, unable to walk on toes, unable to walk on heels    LUMBAR  Musculoskeletal System   Hips: Normal appearance, no leg length discrepancy, normal motion; left, normal motion; right    Lumbar Spine                   Inspection:  Normal alignment, normal sagittal balance                  Range of motion:  Decreased flexion, extension, lateral bending, rotation. Pain with range of motion                  Bony  Palpation of the Lumbar Spine:  + tenderness of the spinous process, no tenderness of the sacrum, no tenderness of the coccyx                  Bony Palpation of the Right Hip:  No tenderness of the iliac crest, no tenderness of the sciatic notch, no tenderness of the SI joint                  Bony Palpation of the Left Hip:  No tenderness of the iliac crest, no tenderness of the sciatic notch, no tenderness of the SI joint                  Soft Tissue Palpation on the Right:  No tenderness of the paraspinal region, no tenderness of the iliolumbar region                  Soft Tissue Palpation on the Left:  No tenderness of the paraspinal region, no tenderness of the iliolumbar region    Motor Strength   L1 Right:  Hip flexion iliopsoas 5/5    L1 Left:  Hip flexion iliopsoas 3/5              L2-L4 Right:  Knee extension quadriceps 5/5, tibialis anterior 5/5              L2-L4 Left:  Knee extension quadriceps 3/5, tibialis anterior 3/5   L5 Right:  Extensor hallucis llongus 5/5,    L5 Left:  Extensor hallucis longus 4/5,    S1 Right:  Plantar flexion gastrocnemius 5/5   S1 Left:  Plantar flexion gastrocnemius 4/5    Neurological System   Ankle Reflex Right:  normal   Ankle Reflex Left: normal   Knee Reflex Right:  normal   Knee Reflex Left:  normal   Sensation on the Right:  L2 normal, L3 normal, L4 normal, L5 normal, S1 normal   Sensation on the Left:  L2 normal, L3 normal, L4 normal, L5 normal, S1 normal              Special Test on the Right:  Seated straight leg raising test negative, no clonus of the ankle              Special Test on the Left:  Seated straight leg raising test negative, no clonus of the ankle    Skin   Lumbosacral Spine:  Normal skin    Cardiovascular System   Arterial Pulses Right:  Posterior tibialis normal, dorsalis pedis normal   Arterial Pulses Left:  Posterior tibialis decreased, dorsalis pedis decreased   Edema Right: None   Edema Left:  None

## 2024-12-16 NOTE — PROGRESS NOTES
AP, lateral, flexion/extension views of the lumbar spine reviewed    On the AP there is thoracolumbar curvature of the right and lumbar curvature to the left.  There are 5 non-rib-bearing lumbar vertebrae.  On the lateral there is decreased lumbar lordosis.  There is spondylotic disease with decreased disc height and osteophyte formation noted.  No fractures or listhesis noted.  No instability on flexion-extension views.    Impression:  Spondylotic changes of the lumbar spine as noted above

## 2024-12-18 NOTE — PATIENT INSTRUCTIONS
Your MRI is scheduled for 01/17 @ 10:45 at [facility].     OLENA scheduled for 12/23@ 1:00PM

## 2024-12-23 ENCOUNTER — HOSPITAL ENCOUNTER (OUTPATIENT)
Dept: RADIOLOGY | Facility: HOSPITAL | Age: 59
Discharge: HOME | End: 2024-12-23
Attending: ORTHOPAEDIC SURGERY

## 2024-12-23 DIAGNOSIS — R09.89 DECREASED PEDAL PULSES: ICD-10-CM

## 2024-12-23 PROCEDURE — 93922 UPR/L XTREMITY ART 2 LEVELS: CPT | Mod: 26,,, | Performed by: RADIOLOGY

## 2024-12-23 PROCEDURE — 93922 UPR/L XTREMITY ART 2 LEVELS: CPT | Mod: TC

## 2024-12-23 PROCEDURE — 93925 LOWER EXTREMITY STUDY: CPT | Mod: 26,,, | Performed by: RADIOLOGY

## 2025-01-15 ENCOUNTER — TELEPHONE (OUTPATIENT)
Dept: ORTHOPEDICS | Facility: CLINIC | Age: 60
End: 2025-01-15

## 2025-01-15 NOTE — TELEPHONE ENCOUNTER
Tosin notified pt of Dr Prasad office number for results.  ----- Message from Gladys sent at 1/15/2025 11:12 AM CST -----  Regarding: Result  Who Called: Mickey Quiles    Patient is a previous patient of Dr Simon Crain and would like to have results of MRI testing given to him.     Preferred Method of Contact: Phone Call  Patient's Preferred Phone Number on File: [phone]   Best Call Back Number, if different:  Additional Information:  Patient has been scheduled for 1/21/2025    12:40 PM Patient would like a call back.

## 2025-03-24 ENCOUNTER — HOSPITAL ENCOUNTER (EMERGENCY)
Facility: HOSPITAL | Age: 60
Discharge: HOME | End: 2025-03-24
Attending: EMERGENCY MEDICINE

## 2025-03-24 VITALS
OXYGEN SATURATION: 95 % | RESPIRATION RATE: 17 BRPM | TEMPERATURE: 97 F | DIASTOLIC BLOOD PRESSURE: 55 MMHG | SYSTOLIC BLOOD PRESSURE: 157 MMHG | BODY MASS INDEX: 25.2 KG/M2 | HEIGHT: 71 IN | WEIGHT: 180 LBS | HEART RATE: 64 BPM

## 2025-03-24 DIAGNOSIS — N39.0 URINARY TRACT INFECTION WITHOUT HEMATURIA, SITE UNSPECIFIED: ICD-10-CM

## 2025-03-24 DIAGNOSIS — R10.13 EPIGASTRIC PAIN: Primary | ICD-10-CM

## 2025-03-24 DIAGNOSIS — R07.9 CHEST PAIN: ICD-10-CM

## 2025-03-24 LAB
ALBUMIN SERPL BCP-MCNC: 4.2 G/DL (ref 3.5–5)
ALBUMIN/GLOB SERPL: 1.3 {RATIO}
ALP SERPL-CCNC: 63 U/L (ref 40–150)
ALT SERPL W P-5'-P-CCNC: 13 U/L
ANION GAP SERPL CALCULATED.3IONS-SCNC: 16 MMOL/L (ref 7–16)
AST SERPL W P-5'-P-CCNC: 23 U/L (ref 11–45)
BACTERIA #/AREA URNS HPF: ABNORMAL /HPF
BASOPHILS # BLD AUTO: 0.06 K/UL (ref 0–0.2)
BASOPHILS NFR BLD AUTO: 0.6 % (ref 0–1)
BILIRUB SERPL-MCNC: 0.9 MG/DL
BILIRUB UR QL STRIP: NEGATIVE
BUN SERPL-MCNC: 11 MG/DL (ref 8–26)
BUN/CREAT SERPL: 12 (ref 6–20)
CALCIUM SERPL-MCNC: 9.9 MG/DL (ref 8.4–10.2)
CHLORIDE SERPL-SCNC: 105 MMOL/L (ref 98–107)
CLARITY UR: ABNORMAL
CO2 SERPL-SCNC: 23 MMOL/L (ref 22–29)
COLOR UR: YELLOW
CREAT SERPL-MCNC: 0.93 MG/DL (ref 0.72–1.25)
DIFFERENTIAL METHOD BLD: ABNORMAL
EGFR (NO RACE VARIABLE) (RUSH/TITUS): 95 ML/MIN/1.73M2
EOSINOPHIL # BLD AUTO: 0.01 K/UL (ref 0–0.5)
EOSINOPHIL NFR BLD AUTO: 0.1 % (ref 1–4)
ERYTHROCYTE [DISTWIDTH] IN BLOOD BY AUTOMATED COUNT: 13.7 % (ref 11.5–14.5)
GLOBULIN SER-MCNC: 3.2 G/DL (ref 2–4)
GLUCOSE SERPL-MCNC: 140 MG/DL (ref 74–100)
GLUCOSE UR STRIP-MCNC: NORMAL MG/DL
HCT VFR BLD AUTO: 51.8 % (ref 40–54)
HGB BLD-MCNC: 17 G/DL (ref 13.5–18)
HYALINE CASTS #/AREA URNS LPF: ABNORMAL /LPF
IMM GRANULOCYTES # BLD AUTO: 0.03 K/UL (ref 0–0.04)
IMM GRANULOCYTES NFR BLD: 0.3 % (ref 0–0.4)
KETONES UR STRIP-SCNC: 20 MG/DL
LACTATE SERPL-SCNC: 1.7 MMOL/L (ref 0.5–2.2)
LEUKOCYTE ESTERASE UR QL STRIP: ABNORMAL
LIPASE SERPL-CCNC: 12 U/L
LYMPHOCYTES # BLD AUTO: 0.95 K/UL (ref 1–4.8)
LYMPHOCYTES NFR BLD AUTO: 9.1 % (ref 27–41)
MCH RBC QN AUTO: 29.8 PG (ref 27–31)
MCHC RBC AUTO-ENTMCNC: 32.8 G/DL (ref 32–36)
MCV RBC AUTO: 90.7 FL (ref 80–96)
MONOCYTES # BLD AUTO: 0.43 K/UL (ref 0–0.8)
MONOCYTES NFR BLD AUTO: 4.1 % (ref 2–6)
MPC BLD CALC-MCNC: 11 FL (ref 9.4–12.4)
MUCOUS, UA: ABNORMAL /LPF
NEUTROPHILS # BLD AUTO: 8.98 K/UL (ref 1.8–7.7)
NEUTROPHILS NFR BLD AUTO: 85.8 % (ref 53–65)
NITRITE UR QL STRIP: POSITIVE
NRBC # BLD AUTO: 0 X10E3/UL
NRBC, AUTO (.00): 0 %
NT-PROBNP SERPL-MCNC: 123 PG/ML (ref 1–125)
PH UR STRIP: 6 PH UNITS
PLATELET # BLD AUTO: 232 K/UL (ref 150–400)
POTASSIUM SERPL-SCNC: 4.1 MMOL/L (ref 3.5–5.1)
PROT SERPL-MCNC: 7.4 G/DL (ref 6.4–8.3)
PROT UR QL STRIP: 70
RBC # BLD AUTO: 5.71 M/UL (ref 4.6–6.2)
RBC # UR STRIP: ABNORMAL /UL
RBC #/AREA URNS HPF: 5 /HPF
SODIUM SERPL-SCNC: 140 MMOL/L (ref 136–145)
SP GR UR STRIP: 1.02
SQUAMOUS #/AREA URNS LPF: ABNORMAL /HPF
TROPONIN I SERPL HS-MCNC: 5.4 NG/L
UROBILINOGEN UR STRIP-ACNC: NORMAL MG/DL
WBC # BLD AUTO: 10.46 K/UL (ref 4.5–11)
WBC #/AREA URNS HPF: 43 /HPF

## 2025-03-24 PROCEDURE — 84484 ASSAY OF TROPONIN QUANT: CPT | Performed by: EMERGENCY MEDICINE

## 2025-03-24 PROCEDURE — 96374 THER/PROPH/DIAG INJ IV PUSH: CPT

## 2025-03-24 PROCEDURE — 93005 ELECTROCARDIOGRAM TRACING: CPT

## 2025-03-24 PROCEDURE — 25000003 PHARM REV CODE 250: Performed by: EMERGENCY MEDICINE

## 2025-03-24 PROCEDURE — 83690 ASSAY OF LIPASE: CPT | Performed by: EMERGENCY MEDICINE

## 2025-03-24 PROCEDURE — 36415 COLL VENOUS BLD VENIPUNCTURE: CPT | Performed by: EMERGENCY MEDICINE

## 2025-03-24 PROCEDURE — 83605 ASSAY OF LACTIC ACID: CPT | Performed by: EMERGENCY MEDICINE

## 2025-03-24 PROCEDURE — 81003 URINALYSIS AUTO W/O SCOPE: CPT | Performed by: EMERGENCY MEDICINE

## 2025-03-24 PROCEDURE — 85025 COMPLETE CBC W/AUTO DIFF WBC: CPT | Performed by: EMERGENCY MEDICINE

## 2025-03-24 PROCEDURE — 93010 ELECTROCARDIOGRAM REPORT: CPT | Mod: ,,, | Performed by: INTERNAL MEDICINE

## 2025-03-24 PROCEDURE — 96361 HYDRATE IV INFUSION ADD-ON: CPT

## 2025-03-24 PROCEDURE — 87077 CULTURE AEROBIC IDENTIFY: CPT | Performed by: EMERGENCY MEDICINE

## 2025-03-24 PROCEDURE — 63600175 PHARM REV CODE 636 W HCPCS: Performed by: EMERGENCY MEDICINE

## 2025-03-24 PROCEDURE — 83880 ASSAY OF NATRIURETIC PEPTIDE: CPT | Performed by: EMERGENCY MEDICINE

## 2025-03-24 PROCEDURE — 80053 COMPREHEN METABOLIC PANEL: CPT | Performed by: EMERGENCY MEDICINE

## 2025-03-24 PROCEDURE — 99285 EMERGENCY DEPT VISIT HI MDM: CPT | Mod: 25

## 2025-03-24 RX ORDER — ONDANSETRON HYDROCHLORIDE 2 MG/ML
4 INJECTION, SOLUTION INTRAVENOUS
Status: DISCONTINUED | OUTPATIENT
Start: 2025-03-24 | End: 2025-03-24 | Stop reason: HOSPADM

## 2025-03-24 RX ORDER — LANSOPRAZOLE 30 MG/1
30 CAPSULE, DELAYED RELEASE ORAL DAILY
Qty: 30 CAPSULE | Refills: 11 | Status: SHIPPED | OUTPATIENT
Start: 2025-03-24 | End: 2026-03-24

## 2025-03-24 RX ORDER — ALUMINUM HYDROXIDE, MAGNESIUM HYDROXIDE, AND SIMETHICONE 1200; 120; 1200 MG/30ML; MG/30ML; MG/30ML
60 SUSPENSION ORAL ONCE
Status: COMPLETED | OUTPATIENT
Start: 2025-03-24 | End: 2025-03-24

## 2025-03-24 RX ORDER — CEFTRIAXONE 1 G/1
1 INJECTION, POWDER, FOR SOLUTION INTRAMUSCULAR; INTRAVENOUS ONCE
Status: DISCONTINUED | OUTPATIENT
Start: 2025-03-24 | End: 2025-03-24 | Stop reason: HOSPADM

## 2025-03-24 RX ORDER — MORPHINE SULFATE 4 MG/ML
4 INJECTION, SOLUTION INTRAMUSCULAR; INTRAVENOUS
Refills: 0 | Status: DISCONTINUED | OUTPATIENT
Start: 2025-03-24 | End: 2025-03-24 | Stop reason: HOSPADM

## 2025-03-24 RX ORDER — HYDRALAZINE HYDROCHLORIDE 20 MG/ML
20 INJECTION INTRAMUSCULAR; INTRAVENOUS
Status: COMPLETED | OUTPATIENT
Start: 2025-03-24 | End: 2025-03-24

## 2025-03-24 RX ORDER — LIDOCAINE HYDROCHLORIDE 20 MG/ML
15 SOLUTION OROPHARYNGEAL ONCE
Status: COMPLETED | OUTPATIENT
Start: 2025-03-24 | End: 2025-03-24

## 2025-03-24 RX ORDER — CEPHALEXIN 500 MG/1
500 CAPSULE ORAL 4 TIMES DAILY
Qty: 20 CAPSULE | Refills: 0 | Status: SHIPPED | OUTPATIENT
Start: 2025-03-24 | End: 2025-03-29

## 2025-03-24 RX ORDER — HYDROCODONE BITARTRATE AND ACETAMINOPHEN 5; 325 MG/1; MG/1
1 TABLET ORAL EVERY 6 HOURS PRN
Qty: 8 TABLET | Refills: 0 | Status: SHIPPED | OUTPATIENT
Start: 2025-03-24

## 2025-03-24 RX ADMIN — LIDOCAINE HYDROCHLORIDE 15 ML: 20 SOLUTION ORAL at 06:03

## 2025-03-24 RX ADMIN — SODIUM CHLORIDE 1000 ML: 0.9 INJECTION, SOLUTION INTRAVENOUS at 04:03

## 2025-03-24 RX ADMIN — ALUMINUM HYDROXIDE, MAGNESIUM HYDROXIDE, AND SIMETHICONE 60 ML: 200; 200; 20 SUSPENSION ORAL at 06:03

## 2025-03-24 RX ADMIN — HYDRALAZINE HYDROCHLORIDE 20 MG: 20 INJECTION INTRAMUSCULAR; INTRAVENOUS at 04:03

## 2025-03-24 NOTE — ED PROVIDER NOTES
Encounter Date: 3/24/2025    SCRIBE #1 NOTE: I, Andra Barreto, am scribing for, and in the presence of,  Brijesh Sellers MD. I have scribed the entire note.       History     Chief Complaint   Patient presents with    Abdominal Pain    Chest Pain     Pt reports cp, abd pain that started this AM.     Neck Pain     This is a 60 y/o male,who presents to the ED with complaints of pain which starts at his abdomen and moves into his chest. He reports diarrhea with the abdominal pain but there is no shortness of breath or vomiting. There is no hx of fever or chills. He also reports neck pain which started 2 days ago. There is no hx of any falls. He states he has 4 slipped discs in his neck and this feels similar to his every day pain. There are no other complaints/pain in the ED at this time. He has a known hx of left ureteral stone. He has had a neck surgery in the past. He is a current every day smoker.     The history is provided by the patient and a relative.     Review of patient's allergies indicates:  No Known Allergies  Past Medical History:   Diagnosis Date    Left ureteral stone     9 mm     Past Surgical History:   Procedure Laterality Date    CYSTOURETEROSCOPY, WITH HOLMIUM LASER LITHOTRIPSY OF URETERAL CALCULUS AND STENT INSERTION Left 6/27/2024    Procedure: CYSTOURETEROSCOPY WITH HOLMIUM LASER LITHOTRIPSY OF URETERAL CALCULUS AND STENT INSERTION;  Surgeon: Ovidio Holliday Jr., MD;  Location: [facility];  Service: Urology;  Laterality: Left;    NECK SURGERY       Family History   Problem Relation Name Age of Onset    Cancer Father       Social History[1]  Review of Systems    Physical Exam     Initial Vitals [03/24/25 1616]   BP Pulse Resp Temp SpO2   (!) 216/105 (!) 56 18 97.4 °F (36.3 °C) 95 %      MAP       --         Physical Exam    Nursing note and vitals reviewed.  Constitutional: He appears well-developed and well-nourished.   HENT:   Head: Normocephalic and atraumatic.   Eyes: Conjunctivae and  EOM are normal. Pupils are equal, round, and reactive to light.   Neck: Neck supple.   Normal range of motion.  Cardiovascular:  Normal rate, regular rhythm, normal heart sounds and intact distal pulses.           Pulmonary/Chest: Breath sounds normal.   Abdominal: Abdomen is soft. Bowel sounds are normal.   Musculoskeletal:         General: Normal range of motion.      Cervical back: Normal range of motion and neck supple.     Neurological: He is alert and oriented to person, place, and time. He has normal strength.   Skin: Skin is warm and dry. Capillary refill takes less than 2 seconds.   Psychiatric: He has a normal mood and affect. Thought content normal.         ED Course   Procedures  Labs Reviewed   COMPREHENSIVE METABOLIC PANEL - Abnormal       Result Value    Sodium 140      Potassium 4.1      Chloride 105      CO2 23      Anion Gap 16      Glucose 140 (*)     BUN 11      Creatinine 0.93      BUN/Creatinine Ratio 12      Calcium 9.9      Total Protein 7.4      Albumin 4.2      Globulin 3.2      A/G Ratio 1.3      Bilirubin, Total 0.9      Alk Phos 63      ALT 13      AST 23      eGFR 95     URINALYSIS, REFLEX TO URINE CULTURE - Abnormal    Color, UA Yellow      Clarity, UA Turbid      pH, UA 6.0      Leukocytes, UA Moderate (*)     Nitrites, UA Positive (*)     Protein, UA 70 (*)     Glucose, UA Normal      Ketones, UA 20 (*)     Urobilinogen, UA Normal      Bilirubin, UA Negative      Blood, UA Trace (*)     Specific Gravity, UA 1.018     CBC WITH DIFFERENTIAL - Abnormal    WBC 10.46      RBC 5.71      Hemoglobin 17.0      Hematocrit 51.8      MCV 90.7      MCH 29.8      MCHC 32.8      RDW 13.7      Platelet Count 232      MPV 11.0      Neutrophils % 85.8 (*)     Lymphocytes % 9.1 (*)     Monocytes % 4.1      Eosinophils % 0.1 (*)     Basophils % 0.6      Immature Granulocytes % 0.3      nRBC, Auto 0.0      Neutrophils, Abs 8.98 (*)     Lymphocytes, Absolute 0.95 (*)     Monocytes, Absolute 0.43       Eosinophils, Absolute 0.01      Basophils, Absolute 0.06      Immature Granulocytes, Absolute 0.03      nRBC, Absolute 0.00      Diff Type Auto     URINALYSIS, MICROSCOPIC - Abnormal    WBC, UA 43 (*)     RBC, UA 5 (*)     Bacteria, UA Many (*)     Squamous Epithelial Cells, UA Occasional (*)     Hyaline Casts, UA 0-2 (*)     Mucous Few (*)    NT-PRO NATRIURETIC PEPTIDE - Normal    ProBNP 123     LIPASE - Normal    Lipase 12     LACTIC ACID, PLASMA - Normal    Lactic Acid 1.7     TROPONIN I - Normal    Troponin I High Sensitivity 5.4     CULTURE, URINE   CBC W/ AUTO DIFFERENTIAL    Narrative:     The following orders were created for panel order CBC auto differential.  Procedure                               Abnormality         Status                     ---------                               -----------         ------                     CBC with Differential[[id number]]       Abnormal            Final result                 Please view results for these tests on the individual orders.          Imaging Results              X-Ray Chest AP Portable (Final result)  Result time 03/24/25 18:22:34   Procedure changed from XR ABDOMEN, ACUTE 2 OR MORE VIEWS WITH CHEST     Final result by Chet Davis MD (03/24/25 18:22:34)                   Impression:      No acute radiographic abnormality.      Electronically signed by: Chet Davis  Date:    03/24/2025  Time:    18:22               Narrative:    EXAMINATION:  XR CHEST AP PORTABLE    CLINICAL HISTORY:  chest pain;    TECHNIQUE:  Single frontal view of the chest was performed.    COMPARISON:  None    FINDINGS:  Postop changes of the cervical spine.    The lungs are clear, with normal appearance of pulmonary vasculature and no pleural effusion or pneumothorax.    The cardiac silhouette is normal in size. The hilar and mediastinal contours are unremarkable.    Bones are intact.                                       Medications   cefTRIAXone injection 1 g (has no  administration in time range)   morphine injection 4 mg (has no administration in time range)   ondansetron injection 4 mg (has no administration in time range)   sodium chloride 0.9% bolus 1,000 mL 1,000 mL (0 mLs Intravenous Stopped 3/24/25 1752)   hydrALAZINE injection 20 mg (20 mg Intravenous Given 3/24/25 1648)   aluminum-magnesium hydroxide-simethicone 200-200-20 mg/5 mL suspension 60 mL (60 mLs Oral Given 3/24/25 1801)     And   LIDOcaine viscous HCl 2% oral solution 15 mL (15 mLs Oral Given 3/24/25 1801)     Medical Decision Making  Amount and/or Complexity of Data Reviewed  Labs: ordered.  Radiology: ordered.    Risk  OTC drugs.  Prescription drug management.              Attending Attestation:           Physician Attestation for Scribe:  Physician Attestation Statement for Scribe #1: I, Brijesh Sellers MD, reviewed documentation, as scribed by Andra Barreto in my presence, and it is both accurate and complete.             ED Course as of 03/24/25 1902   Mon Mar 24, 2025   1802 Chest pain, abdominal pain.  Awaiting urinalysis. [BB]   1855 Urinalysis shows UTI with 43 white cells, many bacteria. [BB]   1855 Medical decision-making:  Differential diagnosis includes abdominal pain, UTI, gastritis, gastroenteritis, pancreatitis.  All testing ordered and interpreted by me. [BB]   1855 Lactic acid is normal.  CBC is normal  CMP is unremarkable.  Lipase is normal. [BB]   1855 Urinalysis shows UTI. [BB]      ED Course User Index  [BB] Ric Gibson MD                           Clinical Impression:  Final diagnoses:  [R07.9] Chest pain  [R10.13] Epigastric pain (Primary)  [N39.0] Urinary tract infection without hematuria, site unspecified          ED Disposition Condition    Discharge Stable          ED Prescriptions       Medication Sig Dispense Start Date End Date Auth. Provider    lansoprazole (PREVACID) 30 MG capsule Take 1 capsule (30 mg total) by mouth once daily. 30 capsule 3/24/2025 3/24/2026 Paige  Ric HAILE MD    cephALEXin (KEFLEX) 500 MG capsule Take 1 capsule (500 mg total) by mouth 4 (four) times daily. for 5 days 20 capsule 3/24/2025 3/29/2025 Ric Gibson MD    HYDROcodone-acetaminophen (NORCO) 5-325 mg per tablet Take 1 tablet by mouth every 6 (six) hours as needed for Pain. 8 tablet 3/24/2025 -- Ric Gibson MD          Follow-up Information    None            [1]   Social History  Tobacco Use    Smoking status: Every Day     Current packs/day: 2.00     Average packs/day: 2.0 packs/day for 35.7 years (71.5 ttl pk-yrs)     Types: Cigarettes     Start date: 6/26/1989    Smokeless tobacco: Never   Substance Use Topics    Alcohol use: Never    Drug use: Not Currently     Types: Marijuana        Ric Gibson MD  03/24/25 0573

## 2025-03-24 NOTE — ED TRIAGE NOTES
Presents to ED for complaints of chest pain and dizziness.  Patient reports that he is also very dizzy at present. Patient reports that his chest pain radiates from mid-chest to neck.

## 2025-03-25 NOTE — DISCHARGE INSTRUCTIONS
Take medication as prescribed.  Follow up in clinic with primary care provider in 2-3 days for recheck.  Return to emergency department for any worsening or further problems.

## 2025-03-26 ENCOUNTER — TELEPHONE (OUTPATIENT)
Dept: EMERGENCY MEDICINE | Facility: HOSPITAL | Age: 60
End: 2025-03-26

## 2025-03-26 ENCOUNTER — RESULTS FOLLOW-UP (OUTPATIENT)
Dept: EMERGENCY MEDICINE | Facility: HOSPITAL | Age: 60
End: 2025-03-26

## 2025-03-26 LAB
OHS QRS DURATION: 108 MS
OHS QTC CALCULATION: 465 MS
UA COMPLETE W REFLEX CULTURE PNL UR: ABNORMAL

## 2025-03-26 RX ORDER — CEFDINIR 300 MG/1
300 CAPSULE ORAL 2 TIMES DAILY
Qty: 20 CAPSULE | Refills: 0 | Status: SHIPPED | OUTPATIENT
Start: 2025-03-26 | End: 2025-04-05

## 2025-03-26 NOTE — TELEPHONE ENCOUNTER
----- Message from JOSH Gamboa sent at 3/26/2025  8:43 AM CDT -----  Please notify him that I sent in some Omnicef for him to take as directed  ----- Message -----  From: Lab, Background User  Sent: 3/25/2025   7:51 AM CDT  To: [facility] Results Follow Up Pool

## (undated) DEVICE — Device

## (undated) DEVICE — SOL NACL IRR 3000ML

## (undated) DEVICE — GOWN POLY REINF BRTH SLV XL

## (undated) DEVICE — GLOVE SENSICARE PI SURG 7.5

## (undated) DEVICE — GOWN NONREINF SET-IN SLV 2XL

## (undated) DEVICE — CATH AXXCESS URET 6F 70CM

## (undated) DEVICE — VALVE ENDOSCOPIC(SURESEAL II)

## (undated) DEVICE — CANISTER SUCTION JUMBO 12L

## (undated) DEVICE — GUIDEWIRE ZIPWIRE STR .038 150

## (undated) DEVICE — SET IRRI FLUID WARMING 300MMHG

## (undated) DEVICE — FIBER MOSES 200 DFL